# Patient Record
Sex: FEMALE | Race: BLACK OR AFRICAN AMERICAN | NOT HISPANIC OR LATINO | Employment: OTHER | URBAN - NONMETROPOLITAN AREA
[De-identification: names, ages, dates, MRNs, and addresses within clinical notes are randomized per-mention and may not be internally consistent; named-entity substitution may affect disease eponyms.]

---

## 2017-05-14 ENCOUNTER — OFFICE VISIT (OUTPATIENT)
Dept: RETAIL CLINIC | Facility: CLINIC | Age: 57
End: 2017-05-14

## 2017-05-14 VITALS
HEART RATE: 88 BPM | BODY MASS INDEX: 40.63 KG/M2 | SYSTOLIC BLOOD PRESSURE: 134 MMHG | HEIGHT: 64 IN | TEMPERATURE: 98.1 F | RESPIRATION RATE: 20 BRPM | OXYGEN SATURATION: 99 % | DIASTOLIC BLOOD PRESSURE: 88 MMHG | WEIGHT: 238 LBS

## 2017-05-14 DIAGNOSIS — J20.9 ACUTE BRONCHITIS, UNSPECIFIED ORGANISM: Primary | ICD-10-CM

## 2017-05-14 DIAGNOSIS — H65.91 OTITIS MEDIA WITH EFFUSION, RIGHT: ICD-10-CM

## 2017-05-14 PROCEDURE — 99203 OFFICE O/P NEW LOW 30 MIN: CPT | Performed by: NURSE PRACTITIONER

## 2017-05-14 RX ORDER — GLIMEPIRIDE 1 MG/1
1 TABLET ORAL 2 TIMES DAILY
COMMUNITY

## 2017-05-14 RX ORDER — MONTELUKAST SODIUM 10 MG/1
10 TABLET ORAL NIGHTLY
COMMUNITY
End: 2021-04-28

## 2017-05-14 RX ORDER — DEXTROMETHORPHAN HYDROBROMIDE AND PROMETHAZINE HYDROCHLORIDE 15; 6.25 MG/5ML; MG/5ML
5 SYRUP ORAL 4 TIMES DAILY PRN
Qty: 120 ML | Refills: 0 | Status: SHIPPED | OUTPATIENT
Start: 2017-05-14 | End: 2017-05-21

## 2017-05-14 RX ORDER — OMEPRAZOLE 40 MG/1
40 CAPSULE, DELAYED RELEASE ORAL DAILY
COMMUNITY
End: 2021-04-28

## 2017-05-14 RX ORDER — FLUOXETINE HYDROCHLORIDE 40 MG/1
40 CAPSULE ORAL DAILY
COMMUNITY
End: 2021-04-28

## 2017-05-14 RX ORDER — HYDROXYZINE PAMOATE 25 MG/1
25 CAPSULE ORAL NIGHTLY PRN
COMMUNITY
End: 2021-04-28

## 2017-05-14 RX ORDER — BUSPIRONE HYDROCHLORIDE 15 MG/1
7.5 TABLET ORAL 2 TIMES DAILY
COMMUNITY
End: 2021-04-28

## 2017-05-14 RX ORDER — MELOXICAM 7.5 MG/1
7.5 TABLET ORAL 2 TIMES DAILY
COMMUNITY
End: 2021-04-28

## 2017-05-14 RX ORDER — HYDROCHLOROTHIAZIDE 12.5 MG/1
12.5 TABLET ORAL DAILY
COMMUNITY
End: 2022-09-23 | Stop reason: SDUPTHER

## 2017-05-14 RX ORDER — ALBUTEROL SULFATE 90 UG/1
2 AEROSOL, METERED RESPIRATORY (INHALATION) EVERY 4 HOURS PRN
Qty: 1 INHALER | Refills: 0 | Status: SHIPPED | OUTPATIENT
Start: 2017-05-14 | End: 2023-01-12

## 2017-05-14 RX ORDER — FLUCONAZOLE 150 MG/1
TABLET ORAL
Qty: 2 TABLET | Refills: 0 | Status: SHIPPED | OUTPATIENT
Start: 2017-05-14 | End: 2021-04-28

## 2017-05-14 RX ORDER — AZITHROMYCIN 250 MG/1
TABLET, FILM COATED ORAL
Qty: 6 TABLET | Refills: 0 | Status: SHIPPED | OUTPATIENT
Start: 2017-05-14 | End: 2021-04-28

## 2017-05-14 RX ORDER — ATORVASTATIN CALCIUM 20 MG/1
20 TABLET, FILM COATED ORAL DAILY
COMMUNITY
End: 2021-04-28

## 2017-05-14 RX ORDER — CETIRIZINE HYDROCHLORIDE 10 MG/1
10 TABLET ORAL DAILY
COMMUNITY
End: 2022-09-16

## 2021-04-20 RX ORDER — DEXLANSOPRAZOLE 60 MG/1
60 CAPSULE, DELAYED RELEASE ORAL DAILY
COMMUNITY

## 2021-04-20 RX ORDER — METHOCARBAMOL 500 MG/1
500 TABLET, FILM COATED ORAL 4 TIMES DAILY
COMMUNITY

## 2021-04-20 RX ORDER — DULOXETIN HYDROCHLORIDE 60 MG/1
60 CAPSULE, DELAYED RELEASE ORAL DAILY
COMMUNITY
End: 2022-09-23 | Stop reason: SDUPTHER

## 2021-04-20 RX ORDER — TOPIRAMATE 100 MG/1
100 TABLET, FILM COATED ORAL DAILY
COMMUNITY
End: 2021-04-28

## 2021-04-28 ENCOUNTER — OFFICE VISIT (OUTPATIENT)
Dept: BARIATRICS/WEIGHT MGMT | Facility: CLINIC | Age: 61
End: 2021-04-28

## 2021-04-28 VITALS
RESPIRATION RATE: 18 BRPM | DIASTOLIC BLOOD PRESSURE: 68 MMHG | HEIGHT: 64 IN | SYSTOLIC BLOOD PRESSURE: 124 MMHG | OXYGEN SATURATION: 99 % | WEIGHT: 226.5 LBS | TEMPERATURE: 97.7 F | BODY MASS INDEX: 38.67 KG/M2 | HEART RATE: 88 BPM

## 2021-04-28 DIAGNOSIS — F41.9 ANXIETY: ICD-10-CM

## 2021-04-28 DIAGNOSIS — D50.9 IRON DEFICIENCY ANEMIA, UNSPECIFIED IRON DEFICIENCY ANEMIA TYPE: ICD-10-CM

## 2021-04-28 DIAGNOSIS — R06.00 DYSPNEA, UNSPECIFIED TYPE: ICD-10-CM

## 2021-04-28 DIAGNOSIS — R53.83 FATIGUE, UNSPECIFIED TYPE: ICD-10-CM

## 2021-04-28 DIAGNOSIS — K21.9 GASTROESOPHAGEAL REFLUX DISEASE, UNSPECIFIED WHETHER ESOPHAGITIS PRESENT: ICD-10-CM

## 2021-04-28 DIAGNOSIS — Z87.891 FORMER SMOKER: ICD-10-CM

## 2021-04-28 DIAGNOSIS — E66.9 OBESITY (BMI 30-39.9): ICD-10-CM

## 2021-04-28 DIAGNOSIS — E11.69 TYPE 2 DIABETES MELLITUS WITH OTHER SPECIFIED COMPLICATION, WITHOUT LONG-TERM CURRENT USE OF INSULIN (HCC): ICD-10-CM

## 2021-04-28 DIAGNOSIS — G47.30 SLEEP APNEA, UNSPECIFIED TYPE: ICD-10-CM

## 2021-04-28 DIAGNOSIS — E78.00 ELEVATED CHOLESTEROL: ICD-10-CM

## 2021-04-28 DIAGNOSIS — T78.40XS ALLERGIC DISORDER, SEQUELA: Primary | ICD-10-CM

## 2021-04-28 DIAGNOSIS — G43.909 MIGRAINE WITHOUT STATUS MIGRAINOSUS, NOT INTRACTABLE, UNSPECIFIED MIGRAINE TYPE: ICD-10-CM

## 2021-04-28 DIAGNOSIS — I10 HYPERTENSION, UNSPECIFIED TYPE: ICD-10-CM

## 2021-04-28 DIAGNOSIS — F32.A DEPRESSION, UNSPECIFIED DEPRESSION TYPE: ICD-10-CM

## 2021-04-28 PROCEDURE — 99204 OFFICE O/P NEW MOD 45 MIN: CPT | Performed by: PHYSICIAN ASSISTANT

## 2021-04-28 RX ORDER — ATORVASTATIN CALCIUM 40 MG/1
40 TABLET, FILM COATED ORAL DAILY
COMMUNITY
Start: 2021-03-13

## 2021-04-28 RX ORDER — FEXOFENADINE HCL 180 MG/1
180 TABLET ORAL DAILY
COMMUNITY

## 2021-04-28 RX ORDER — TOPIRAMATE 100 MG/1
100 TABLET, FILM COATED ORAL
COMMUNITY
End: 2022-09-23 | Stop reason: SDUPTHER

## 2021-04-28 RX ORDER — DULOXETIN HYDROCHLORIDE 30 MG/1
30 CAPSULE, DELAYED RELEASE ORAL DAILY
COMMUNITY
Start: 2021-02-22 | End: 2022-09-23 | Stop reason: SDUPTHER

## 2021-04-28 NOTE — PROGRESS NOTES
[facility] BARIATRIC SURGERY  [address]  [address]  Formerly [facility] [id number] [phone]      Patient  Name:  Rolanda Lao  :  1960        Chief Complaint:  weight gain; unable to maintain weight loss    History of Present Illness:  Rolanda Lao is a 60 y.o. female who presents today for evaluation, education and consultation regarding bariatric and metabolic surgery. The patient is interested in sleeve gastrectomy with Dr. Montgomery.     Rolanda has been overweight for at least 42 years, has been 35 pounds or more overweight for at least 42 years, has been 100 pounds or more overweight for 36 or more years and started dieting at age 15.      Previous diet attempts include: High Protein, Low Carbohydrate, Salima's Diet, [place], Cabbage Soup, Fasting and Slim Fast; Weight Watchers; Prozac.  The most weight Rolanda lost was 50 pounds on starving but was only able to maintain that weight loss for 22 years.  Her maximum lifetime weight is 289 pounds.    As above, patient has been overweight for many years, with numerous failed dietary/weight loss attempts.  She now has obesity related comorbidities and as such has decided to pursue weight loss surgery.      Saw [facility] for hiatal hernia as was advised weightloss prior to HHR.  She states she has h/o prior hiatal hernia repair with Dr. Grey at [place] with mesh placement in . Symptoms resolved then returned 3y ago after stomach virus and forceful vomiting.   Now with uncontrolled reflux on dexilant 60mg daily with nighttime symptoms on a daily basis. Also with occ dysphagia. Denies nausea, vomiting, abd pain or other GI complaints. GB present. Says she had a GES last fall and was normal.  Most recent EGD 3/2020 with esophagitis. bx negative. No mention of hiatal hernia. No h/o h pylori.     H/o HTN, HLD, sleep apnea- noncompliant with CPAP, DM dx  not on insulin, last A1C 5.1. iron def anemia in the past, not on  "iron currently. Migraines treated with topamax and prn APAP, anxiety/ depression. Environmental allergies on allergy injections.     All other past medical, surgical, social and family history have been obtained and discussed as pertinent to bariatric surgery as below.     Past Medical History:   Diagnosis Date   • Acid reflux     uncontrolled on dexilant 60mg QD, wakes up nightly with symptoms   • Allergic     allergy injections   • Anxiety    • Depression    • Depression    • Elevated cholesterol    • Fatigue    • Hiatal hernia     with past repair   • Hypertension    • Iron deficiency anemia     not on supplments   • Migraines     topamax nightly and APAP prn   • Obesity (BMI 30-39.9)    • Sleep apnea     non compliant with CPAP   • Type 2 diabetes mellitus (CMS/[facility]) 2001    not on insulin, last A1C 5.1     Past Surgical History:   Procedure Laterality Date   • CARPAL TUNNEL RELEASE     • ENDOSCOPY  03/13/2020    Dr. Paul Beauchamp   • KNEE SURGERY      bilateral 1997/1998   • LAPAROSCOPY REPAIR HIATAL HERNIA  2016    Dr. Matilda Beauchamp? with mesh   • ROTATOR CUFF REPAIR     • SINUS SURGERY         Allergies   Allergen Reactions   • Morphine And Related Itching     \"skin crawls\" tolerates other pain meds   • Latex Rash     blister       Current Outpatient Medications:   •  albuterol (PROVENTIL HFA;VENTOLIN HFA) 108 (90 BASE) MCG/ACT inhaler, Inhale 2 puffs Every 4 (Four) Hours As Needed for Wheezing., Disp: 1 inhaler, Rfl: 0  •  atorvastatin (LIPITOR) 40 MG tablet, Take 40 mg by mouth Daily., Disp: , Rfl:   •  Canagliflozin-Metformin HCl (INVOKAMET)  MG tablet, Take  by mouth., Disp: , Rfl:   •  cetirizine (zyrTEC) 10 MG tablet, Take 10 mg by mouth Daily., Disp: , Rfl:   •  dexlansoprazole (Dexilant) 60 MG capsule, Take 60 mg by mouth Daily., Disp: , Rfl:   •  DULoxetine (CYMBALTA) 30 MG capsule, Take 30 mg by mouth Daily., Disp: , Rfl:   •  DULoxetine (CYMBALTA) 60 MG capsule, Take 60 mg by mouth " Daily., Disp: , Rfl:   •  fexofenadine (ALLEGRA) 180 MG tablet, Take 180 mg by mouth Daily., Disp: , Rfl:   •  glimepiride (AMARYL) 1 MG tablet, Take 1 mg by mouth Every Morning Before Breakfast., Disp: , Rfl:   •  hydrochlorothiazide (HYDRODIURIL) 12.5 MG tablet, Take 12.5 mg by mouth Daily., Disp: , Rfl:   •  metFORMIN (GLUCOPHAGE) 1000 MG tablet, Take 1,000 mg by mouth., Disp: , Rfl:   •  methocarbamol (ROBAXIN) 500 MG tablet, Take 500 mg by mouth 4 (Four) Times a Day., Disp: , Rfl:   •  topiramate (TOPAMAX) 100 MG tablet, Take 100 mg by mouth 2 (Two) Times a Day., Disp: , Rfl:     Social History     Socioeconomic History   • Marital status:      Spouse name: Not on file   • Number of children: Not on file   • Years of education: Not on file   • Highest education level: Not on file   Tobacco Use   • Smoking status: Former Smoker     Packs/day: 2.00     Years: 10.00     Pack years: 20.00     Types: Cigarettes     Quit date: 2001     Years since quittin.0   • Smokeless tobacco: Never Used   Vaping Use   • Vaping Use: Never used   Substance and Sexual Activity   • Alcohol use: No   • Drug use: Yes     Types: Marijuana     Comment: occasionally   • Sexual activity: Defer     Family History   Problem Relation Age of Onset   • Obesity Mother    • Diabetes Mother    • Hypertension Mother    • Sleep apnea Mother    • Obesity Father    • Obesity Sister    • Hypertension Sister    • Sleep apnea Sister    • Obesity Brother    • Hypertension Brother    • Stroke Brother    • Obesity Maternal Grandmother    • Obesity Maternal Grandfather    • Obesity Paternal Grandmother    • Heart attack Paternal Grandmother    • Obesity Paternal Grandfather    • Diabetes Paternal Grandfather    • Obesity Sister    • Hypertension Sister    • Obesity Brother    • Hypertension Brother    • Heart attack Brother    • Sleep apnea Brother        Review of Systems:  Constitutional:  Reports fatigue, weight gain and denies fevers,  chills.  HEENT:  denies headache, ear pain or loss of hearing, blurred or double vision, nasal discharge or sore throat.  Cardiovascular:  Reports HTN, HLD and denies CAD, Atrial Fib, hx heart disease, heart murmur, hx MI, chest pain, palpitations, edema, hx DVT.  Respiratory:  Reports sleep apnea and denies dyspnea on exertion, shortness of breath , cough , wheezing, asthma, hx PE.  Gastrointestinal:  Reports dysphagia, heartburn and denies nausea, vomiting, abdominal pain, IBS, diarrhea, constipation, melena, blood in stool, gallbladder issues, liver disease, hx pancreatitis.  Genitourinary:  Reports none and denies history of  frequent UTI, incontinence, hematuria, dysuria, polyuria, polydipsia, renal insufficiency, renal failure.    Musculoskeletal:  Reports none and denies joint pain, fibromyalgia, arthritis and autoimmune disease.  Neurological:  Reports migraines and denies numbness /tingling, dizziness, confusion, seizure, stroke.  Psychiatric:  Reports anxiety, depression and denies bipolar disorder, suicidal ideation, hx suicide attempt, hx self injury, hx substance abuse, eating disorder.  Endocrine:  Reports diabetes and denies thyroid disease, gout.  Hematologic:  Reports anemia and denies bruising, bleeding disorder, hx blood transfusion.  Skin:  Reports none and denies rashes, hx MRSA.      Physical Exam:  Vital Signs:  Weight: 103 kg (226 lb 8 oz)   Body mass index is 38.88 kg/m².  Temp: 97.7 °F (36.5 °C)   Heart Rate: 88   BP: 124/68     Physical Exam  Vitals reviewed.   Constitutional:       Appearance: She is well-developed. She is obese.   HENT:      Head: Normocephalic.      Comments: mask  Neck:      Thyroid: No thyromegaly.   Cardiovascular:      Rate and Rhythm: Normal rate and regular rhythm.      Heart sounds: Normal heart sounds.   Pulmonary:      Effort: Pulmonary effort is normal. No respiratory distress.      Breath sounds: Normal breath sounds. No wheezing.   Abdominal:      General:  Bowel sounds are normal. There is no distension.      Palpations: Abdomen is soft.      Tenderness: There is no abdominal tenderness.      Comments: Lap scars   Musculoskeletal:         General: Normal range of motion.      Cervical back: Normal range of motion and neck supple.   Skin:     General: Skin is warm and dry.   Neurological:      Mental Status: She is alert and oriented to person, place, and time.   Psychiatric:         Mood and Affect: Mood normal.         Behavior: Behavior normal.         Thought Content: Thought content normal.         Judgment: Judgment normal.         Patient Active Problem List   Diagnosis   • Hypertension   • Elevated cholesterol   • Type 2 diabetes mellitus (CMS/HCC)   • Sleep apnea   • Depression   • Allergic   • Acid reflux   • Migraines   • Anxiety   • Iron deficiency anemia   • Fatigue   • Obesity (BMI 30-39.9)       Assessment:    Rolanda Lao is a 60 y.o. year old female with medically complicated obesity pursuing sleeve gastrectomy.    Weight loss surgery is deemed medically necessary given the following obesity related comorbidities including sleep apnea, diabetes, hypertension, dyslipidemia, GERD, edema and depression with current Weight: 103 kg (226 lb 8 oz) and Body mass index is 38.88 kg/m²..    Plan:  The consultation plan and program requirements were reviewed with the patient.  The patient has been advised that a letter of medical support must be obtained from her primary care physician or referring provider. A psychological evaluation will be arranged.  A nutritional evaluation will be performed.  The patient was advised to start a high protein and low carbohydrate diet.  Necessary lifestyle modifications were discussed.  Instructions on how to access BIRD was given to the patient.  BIRD is an internet based educational video that explains the surgical procedure chosen and answers basic questions regarding that procedure.     Patient's Body mass index is 38.88  kg/m². BMI is above normal parameters. Recommendations include: exercise counseling, referral to a nutritionist and bariatric surgery.        Preoperative testing will include: CBC, CMP, Lipids, TSH, HgA1C, EKG, CXR, Pulmonary Function Testing, Gastric Emptying Study and EGD, HP serum ,UDS    Additional preop clearances required prior to surgery: Cardiology.      The patient has been educated on expected postoperative lifestyle changes, including commitment to high protein diet, vitamin regimen, and exercise program.  They are aware that support groups are encouraged for optimal weight loss results. Patient understands that bariatric surgery is not cosmetic surgery but rather a tool to help make a lifelong commitment to lifestyle changes including diet, exercise, behavior modifications, and healthy habits. The procedure was discussed with the patient and all questions were answered. The importance of avoiding ASA/ NSAIDS/ steroids/ tobacco/ hormones/ immunomodulators perioperatively was discussed.         Lauren Christianson PA-C

## 2021-04-29 LAB
ALBUMIN SERPL-MCNC: 4.6 G/DL (ref 3.8–4.9)
ALBUMIN/GLOB SERPL: 2.1 {RATIO} (ref 1.2–2.2)
ALP SERPL-CCNC: 98 IU/L (ref 39–117)
ALT SERPL-CCNC: 16 IU/L (ref 0–32)
AST SERPL-CCNC: 16 IU/L (ref 0–40)
BASOPHILS # BLD AUTO: 0.1 X10E3/UL (ref 0–0.2)
BASOPHILS NFR BLD AUTO: 2 %
BILIRUB SERPL-MCNC: <0.2 MG/DL (ref 0–1.2)
BUN SERPL-MCNC: 17 MG/DL (ref 6–24)
BUN/CREAT SERPL: 17 (ref 9–23)
CALCIUM SERPL-MCNC: 10.2 MG/DL (ref 8.7–10.2)
CHLORIDE SERPL-SCNC: 102 MMOL/L (ref 96–106)
CHOLEST SERPL-MCNC: 216 MG/DL (ref 100–199)
CO2 SERPL-SCNC: 24 MMOL/L (ref 20–29)
CREAT SERPL-MCNC: 1 MG/DL (ref 0.57–1)
EOSINOPHIL # BLD AUTO: 0.2 X10E3/UL (ref 0–0.4)
EOSINOPHIL NFR BLD AUTO: 4 %
ERYTHROCYTE [DISTWIDTH] IN BLOOD BY AUTOMATED COUNT: 16.4 % (ref 11.7–15.4)
GLOBULIN SER CALC-MCNC: 2.2 G/DL (ref 1.5–4.5)
GLUCOSE SERPL-MCNC: 107 MG/DL (ref 65–99)
H PYLORI IGA SER-ACNC: <9 UNITS (ref 0–8.9)
H PYLORI IGG SER IA-ACNC: 0.27 INDEX VALUE (ref 0–0.79)
H PYLORI IGM SER-ACNC: <9 UNITS (ref 0–8.9)
HBA1C MFR BLD: 6.7 % (ref 4.8–5.6)
HCT VFR BLD AUTO: 38.9 % (ref 34–46.6)
HDLC SERPL-MCNC: 51 MG/DL
HGB BLD-MCNC: 12.3 G/DL (ref 11.1–15.9)
IMM GRANULOCYTES # BLD AUTO: 0 X10E3/UL (ref 0–0.1)
IMM GRANULOCYTES NFR BLD AUTO: 1 %
LDLC SERPL CALC-MCNC: 143 MG/DL (ref 0–99)
LYMPHOCYTES # BLD AUTO: 2.5 X10E3/UL (ref 0.7–3.1)
LYMPHOCYTES NFR BLD AUTO: 38 %
MCH RBC QN AUTO: 24.1 PG (ref 26.6–33)
MCHC RBC AUTO-ENTMCNC: 31.6 G/DL (ref 31.5–35.7)
MCV RBC AUTO: 76 FL (ref 79–97)
MONOCYTES # BLD AUTO: 0.5 X10E3/UL (ref 0.1–0.9)
MONOCYTES NFR BLD AUTO: 8 %
NEUTROPHILS # BLD AUTO: 3.2 X10E3/UL (ref 1.4–7)
NEUTROPHILS NFR BLD AUTO: 47 %
PLATELET # BLD AUTO: 404 X10E3/UL (ref 150–450)
POTASSIUM SERPL-SCNC: 4 MMOL/L (ref 3.5–5.2)
PROT SERPL-MCNC: 6.8 G/DL (ref 6–8.5)
RBC # BLD AUTO: 5.11 X10E6/UL (ref 3.77–5.28)
SODIUM SERPL-SCNC: 139 MMOL/L (ref 134–144)
TRIGL SERPL-MCNC: 124 MG/DL (ref 0–149)
TSH SERPL DL<=0.005 MIU/L-ACNC: 1.24 UIU/ML (ref 0.45–4.5)
VLDLC SERPL CALC-MCNC: 22 MG/DL (ref 5–40)
WBC # BLD AUTO: 6.5 X10E3/UL (ref 3.4–10.8)

## 2021-04-30 ENCOUNTER — OFFICE VISIT (OUTPATIENT)
Dept: CARDIOLOGY | Facility: CLINIC | Age: 61
End: 2021-04-30

## 2021-04-30 VITALS
HEART RATE: 109 BPM | DIASTOLIC BLOOD PRESSURE: 86 MMHG | OXYGEN SATURATION: 99 % | RESPIRATION RATE: 18 BRPM | BODY MASS INDEX: 38.41 KG/M2 | WEIGHT: 225 LBS | SYSTOLIC BLOOD PRESSURE: 120 MMHG | HEIGHT: 64 IN

## 2021-04-30 DIAGNOSIS — Z01.810 PREOP CARDIOVASCULAR EXAM: Primary | ICD-10-CM

## 2021-04-30 PROCEDURE — 99203 OFFICE O/P NEW LOW 30 MIN: CPT | Performed by: INTERNAL MEDICINE

## 2021-04-30 PROCEDURE — 93000 ELECTROCARDIOGRAM COMPLETE: CPT | Performed by: INTERNAL MEDICINE

## 2021-04-30 NOTE — PROGRESS NOTES
[facility] OP Consult Note    Rolanda Lao  [id number]  2021    Referred By: Lauren Christianson PA-C    Chief Complaint: Preoperative cardiac risk assessment    History of Present Illness:   Mrs. Rolanda Lao is a 60 y.o. female who presents to the Cardiology Clinic for evaluation of preoperative cardiac risk assessment in consideration of undergoing bariatric surgery for weight loss.  The patient has a past medical history significant for hypertension, hyperlipidemia, type 2 diabetes mellitus, and obesity with a BMI 38.62 kg/m².  She does not have any significant past cardiac history.  She presents today for preoperative risk assessment prior to undergoing bariatric surgery with gastric sleeve placement.  The patient reports she is active walking for exercise on a regular basis, and denies exertional chest pain or anginal symptoms.  No significant exertional dyspnea.  She denies any history of palpitations.  No presyncopal or syncopal events.  She reports mild fluctuating lower extremity edema typically worse after high sodium intake.  No orthopnea or PND.  No specific complaints at this time.    Past Cardiac Testin. Last Coronary Angio: None  2. Prior Stress Testing: None  3. Last Echo: None  4. Prior Holter Monitor: None    Review of Systems:   Review of Systems   Constitutional: Negative for activity change, appetite change, chills, diaphoresis, fatigue, fever, unexpected weight gain and unexpected weight loss.   Eyes: Negative for blurred vision and double vision.   Respiratory: Negative for cough, chest tightness, shortness of breath and wheezing.    Cardiovascular: Negative for chest pain, palpitations and leg swelling.   Gastrointestinal: Negative for abdominal pain, anal bleeding, blood in stool and GERD.   Endocrine: Negative for cold intolerance and heat intolerance.   Genitourinary: Negative for hematuria.   Neurological: Negative for dizziness, syncope,  weakness and light-headedness.   Hematological: Does not bruise/bleed easily.   Psychiatric/Behavioral: Negative for depressed mood and stress. The patient is not nervous/anxious.        Past Medical History:   Past Medical History:   Diagnosis Date   • Acid reflux     uncontrolled on dexilant 60mg QD, wakes up nightly with symptoms   • Allergic     allergy injections   • Anxiety    • Depression    • Depression    • Elevated cholesterol    • Fatigue    • Hiatal hernia     with past repair   • Hypertension    • Iron deficiency anemia     not on supplments   • Migraines     topamax nightly and APAP prn   • Obesity (BMI 30-39.9)    • Sleep apnea     non compliant with CPAP   • Type 2 diabetes mellitus (CMS/[facility]) 2001    not on insulin, last A1C 5.1       Past Surgical History:   Past Surgical History:   Procedure Laterality Date   • CARPAL TUNNEL RELEASE     • ENDOSCOPY  03/13/2020    Dr. Paul Beauchamp   • KNEE SURGERY      bilateral 1997/1998   • LAPAROSCOPY REPAIR HIATAL HERNIA  2016    Dr. Matilda Beauchamp? with mesh   • ROTATOR CUFF REPAIR     • SINUS SURGERY         Family History:   Family History   Problem Relation Age of Onset   • Obesity Mother    • Diabetes Mother    • Hypertension Mother    • Sleep apnea Mother    • Obesity Father    • Obesity Sister    • Hypertension Sister    • Sleep apnea Sister    • Obesity Brother    • Hypertension Brother    • Stroke Brother    • Obesity Maternal Grandmother    • Obesity Maternal Grandfather    • Obesity Paternal Grandmother    • Heart attack Paternal Grandmother    • Obesity Paternal Grandfather    • Diabetes Paternal Grandfather    • Obesity Sister    • Hypertension Sister    • Obesity Brother    • Hypertension Brother    • Heart attack Brother    • Sleep apnea Brother        Social History:   Social History     Socioeconomic History   • Marital status:      Spouse name: Not on file   • Number of children: Not on file   • Years of education: Not on file   •  "Highest education level: Not on file   Tobacco Use   • Smoking status: Former Smoker     Packs/day: 2.00     Years: 10.00     Pack years: 20.00     Types: Cigarettes     Quit date: 2001     Years since quittin.0   • Smokeless tobacco: Never Used   Vaping Use   • Vaping Use: Never used   Substance and Sexual Activity   • Alcohol use: No   • Drug use: Yes     Types: Marijuana     Comment: occasionally   • Sexual activity: Defer       Medications:     Current Outpatient Medications:   •  albuterol (PROVENTIL HFA;VENTOLIN HFA) 108 (90 BASE) MCG/ACT inhaler, Inhale 2 puffs Every 4 (Four) Hours As Needed for Wheezing., Disp: 1 inhaler, Rfl: 0  •  atorvastatin (LIPITOR) 40 MG tablet, Take 40 mg by mouth Daily., Disp: , Rfl:   •  Canagliflozin-Metformin HCl (INVOKAMET)  MG tablet, Take  by mouth 2 (two) times a day., Disp: , Rfl:   •  cetirizine (zyrTEC) 10 MG tablet, Take 10 mg by mouth Daily., Disp: , Rfl:   •  dexlansoprazole (Dexilant) 60 MG capsule, Take 60 mg by mouth Daily., Disp: , Rfl:   •  DULoxetine (CYMBALTA) 30 MG capsule, Take 30 mg by mouth Daily., Disp: , Rfl:   •  DULoxetine (CYMBALTA) 60 MG capsule, Take 60 mg by mouth Daily., Disp: , Rfl:   •  fexofenadine (ALLEGRA) 180 MG tablet, Take 180 mg by mouth Daily., Disp: , Rfl:   •  glimepiride (AMARYL) 1 MG tablet, Take 1 mg by mouth 2 (two) times a day., Disp: , Rfl:   •  hydrochlorothiazide (HYDRODIURIL) 12.5 MG tablet, Take 12.5 mg by mouth Daily., Disp: , Rfl:   •  metFORMIN (GLUCOPHAGE) 1000 MG tablet, Take 1,000 mg by mouth 2 (two) times a day., Disp: , Rfl:   •  methocarbamol (ROBAXIN) 500 MG tablet, Take 500 mg by mouth 4 (Four) Times a Day., Disp: , Rfl:   •  topiramate (TOPAMAX) 100 MG tablet, Take 100 mg by mouth Daily Before Lunch., Disp: , Rfl:     Allergies:   Allergies   Allergen Reactions   • Morphine And Related Itching     \"skin crawls\" tolerates other pain meds   • Latex Rash     blister       Physical Exam:  Vital Signs: " "  Vitals:    04/30/21 0910 04/30/21 0914   BP: 120/84 120/86   BP Location: Left arm Right arm   Patient Position: Sitting    Pulse: 109    Resp: 18    SpO2: 99%    Weight: 102 kg (225 lb)    Height: 162.6 cm (64\")        Physical Exam  Constitutional:       General: She is not in acute distress.     Appearance: She is well-developed. She is obese. She is not diaphoretic.   HENT:      Head: Normocephalic and atraumatic.   Eyes:      General: No scleral icterus.     Pupils: Pupils are equal, round, and reactive to light.   Neck:      Trachea: No tracheal deviation.   Cardiovascular:      Rate and Rhythm: Normal rate and regular rhythm.      Heart sounds: Normal heart sounds. No murmur heard.   No friction rub. No gallop.       Comments: Normal JVD.  Pulmonary:      Effort: Pulmonary effort is normal. No respiratory distress.      Breath sounds: Normal breath sounds. No stridor. No wheezing or rales.   Chest:      Chest wall: No tenderness.   Abdominal:      General: Bowel sounds are normal. There is no distension.      Palpations: Abdomen is soft.      Tenderness: There is no abdominal tenderness. There is no guarding or rebound.   Musculoskeletal:         General: No swelling. Normal range of motion.      Cervical back: Neck supple. No tenderness.   Lymphadenopathy:      Cervical: No cervical adenopathy.   Skin:     General: Skin is warm and dry.      Findings: No erythema.   Neurological:      General: No focal deficit present.      Mental Status: She is alert and oriented to person, place, and time.   Psychiatric:         Mood and Affect: Mood normal.         Behavior: Behavior normal.         Results Review:   I reviewed the patient's new clinical results.  I personally viewed and interpreted the patient's EKG/Telemetry data      ECG 12 Lead    Date/Time: 4/30/2021 9:28 AM  Performed by: Buddy Soler MD  Authorized by: Buddy Soler MD   Comparison: not compared with previous ECG   Rhythm: sinus " rhythm  Rate: normal  QRS axis: normal  Other findings: low voltage  Clinical impression comment: Borderline ECG              Assessment / Plan:     1. Preop cardiovascular exam   --Presents today for preoperative cardiac risk assessment and consideration of undergoing bariatric surgery  --No significant prior cardiac history  --ECG today shows NSR with no significant abnormalities  --No chest pain or exertional anginal symptoms  --No evidence of decompensated CHF, valvulopathy, or arrhythmia  --The patient is currently at low risk for adverse perioperative cardiac events with a low risk revised cardiac risk index of 0.4%.  Given her functional status is > 4 METS without anginal symptoms it is reasonable to proceed with other procedure without further cardiac testing or interventions  --Will follow on a as needed basis    Follow Up:   Return if symptoms worsen or fail to improve.      Thank you for allowing me to participate in the care of your patient. Please to not hesitate to contact me with additional questions or concerns.     MAMIE Soler MD  Interventional Cardiology   04/30/2021  09:26 EDT

## 2021-05-05 ENCOUNTER — DOCUMENTATION (OUTPATIENT)
Dept: BARIATRICS/WEIGHT MGMT | Facility: CLINIC | Age: 61
End: 2021-05-05

## 2021-05-05 NOTE — PROGRESS NOTES
"Weight Loss Surgery  Presurgical Nutrition Assessment     Rolanda Lao  04/28/2021  [id number]  [id number]  1960  female    Surgery desired: Sleeve Gastrectomy    Ht 162.6 cm (64\"); Wt 102 kg (225 #); BMI 38.62  Past Medical History:   Diagnosis Date   • Acid reflux     uncontrolled on dexilant 60mg QD, wakes up nightly with symptoms   • Allergic     allergy injections   • Anxiety    • Depression    • Depression    • Elevated cholesterol    • Fatigue    • Hiatal hernia     with past repair   • Hypertension    • Iron deficiency anemia     not on supplments   • Migraines     topamax nightly and APAP prn   • Obesity (BMI 30-39.9)    • Sleep apnea     non compliant with CPAP   • Type 2 diabetes mellitus (CMS/HCC) 2001    not on insulin, last A1C 5.1     Past Surgical History:   Procedure Laterality Date   • CARPAL TUNNEL RELEASE     • ENDOSCOPY  03/13/2020    Dr. Paul Beauchamp   • KNEE SURGERY      bilateral 1997/1998   • LAPAROSCOPY REPAIR HIATAL HERNIA  2016    Dr. Matilda Beauchamp? with mesh   • ROTATOR CUFF REPAIR     • SINUS SURGERY       Allergies   Allergen Reactions   • Morphine And Related Itching     \"skin crawls\" tolerates other pain meds   • Latex Rash     blister       Current Outpatient Medications:   •  albuterol (PROVENTIL HFA;VENTOLIN HFA) 108 (90 BASE) MCG/ACT inhaler, Inhale 2 puffs Every 4 (Four) Hours As Needed for Wheezing., Disp: 1 inhaler, Rfl: 0  •  atorvastatin (LIPITOR) 40 MG tablet, Take 40 mg by mouth Daily., Disp: , Rfl:   •  Canagliflozin-Metformin HCl (INVOKAMET)  MG tablet, Take  by mouth 2 (two) times a day., Disp: , Rfl:   •  cetirizine (zyrTEC) 10 MG tablet, Take 10 mg by mouth Daily., Disp: , Rfl:   •  dexlansoprazole (Dexilant) 60 MG capsule, Take 60 mg by mouth Daily., Disp: , Rfl:   •  DULoxetine (CYMBALTA) 30 MG capsule, Take 30 mg by mouth Daily., Disp: , Rfl:   •  DULoxetine (CYMBALTA) 60 MG capsule, Take 60 mg by mouth Daily., Disp: , Rfl:   •  fexofenadine " (ALLEGRA) 180 MG tablet, Take 180 mg by mouth Daily., Disp: , Rfl:   •  glimepiride (AMARYL) 1 MG tablet, Take 1 mg by mouth 2 (two) times a day., Disp: , Rfl:   •  hydrochlorothiazide (HYDRODIURIL) 12.5 MG tablet, Take 12.5 mg by mouth Daily., Disp: , Rfl:   •  metFORMIN (GLUCOPHAGE) 1000 MG tablet, Take 1,000 mg by mouth 2 (two) times a day., Disp: , Rfl:   •  methocarbamol (ROBAXIN) 500 MG tablet, Take 500 mg by mouth 4 (Four) Times a Day., Disp: , Rfl:   •  topiramate (TOPAMAX) 100 MG tablet, Take 100 mg by mouth Daily Before Lunch., Disp: , Rfl:       Nutrition Assessment    Estimated energy needs:  1585 kcal    Estimated calories for weight loss:  1400 kcal    IBW (Pounds):  145 #        Excess body weight (Pounds):  80 #       Nutrition Recall  24 Hour recall: (B) (L) (D) -  Reviewed and discussed with patient.  Eats 2-3 meals qd.  Pt has been to diabetes education classes, as well as to [facility] for several months, at least.  Capably states principles of diet for wt mgt.  Brkfast = 1 sl bread /c 2 tbsp PB & 1 c black coffee.  Second meal = cheese slices /c bread, chips & 2 sm Bit o Honey candies.  Drank diet [employer]eps[employer] untio 2017 - now no soda.  Pt devoid of vegetables & fruits & low in protein. Pt to focus on ingesting adeq protein for wt mgt in 3 reg balanced meals + 2-3 high prot snks qd.         Exercise  daily - walks 1/2 mile qd in neighborhood      Education    Provided information packet re:  Sleeve Gastrectomy  1. Reviewed guidelines for higher protein, limited carbohydrate diet to promote weight loss.  Encouraged patient to incorporate these principles of healthy eating from now until approximately 2 weeks prior to bariatric surgery date, when an even lower carbohydrate “liver-shrinking” regimen will be followed. (Information sheet re pre-op diet given).  Explained that after recovery from surgery this diet will again be followed to ensure further loss and for weight maintenance.    2. Encouraged patient to  choose an acceptable protein supplement powder or shake for post-surgery liquid diet.  Provided product guidelines and examples.    3. Explained importance of goal setting to help in changing eating behaviors that are not conducive to weight loss.  Targeted several on a worksheet which also included spaces for patient to work on issues specific to them.  4. Provided follow-up options for support, including contact information for dietitians here, if desired.  Web-based support information and apps for smart phones and computers given.  Noted that monthly support group is offered at this clinic, and that support is associated with successful weight loss.    Recommend that team proceed with surgery and follow per protocol.      Nutrition Goals   Dietary Guidelines per information packet as described above  Protein goal:  grams per day   Carbohydrate goal:  100-140 grams per day  Eliminate soda, sweet tea, etc.     Exercise Goals  Continue current exercise routine   Add 15-30 minutes of activity per day as tolerated      Monserrat Marks RD  05/05/2021  14:01 EDT

## 2021-05-07 ENCOUNTER — HOSPITAL ENCOUNTER (OUTPATIENT)
Dept: PULMONOLOGY | Facility: HOSPITAL | Age: 61
Discharge: HOME | End: 2021-05-07
Admitting: PHYSICIAN ASSISTANT

## 2021-05-07 DIAGNOSIS — R06.00 DYSPNEA, UNSPECIFIED TYPE: ICD-10-CM

## 2021-05-07 DIAGNOSIS — Z87.891 FORMER SMOKER: ICD-10-CM

## 2021-05-07 PROCEDURE — 94726 PLETHYSMOGRAPHY LUNG VOLUMES: CPT | Performed by: INTERNAL MEDICINE

## 2021-05-07 PROCEDURE — 94726 PLETHYSMOGRAPHY LUNG VOLUMES: CPT

## 2021-05-07 PROCEDURE — 94640 AIRWAY INHALATION TREATMENT: CPT

## 2021-05-07 PROCEDURE — 94729 DIFFUSING CAPACITY: CPT | Performed by: INTERNAL MEDICINE

## 2021-05-07 PROCEDURE — 94060 EVALUATION OF WHEEZING: CPT | Performed by: INTERNAL MEDICINE

## 2021-05-07 PROCEDURE — 94060 EVALUATION OF WHEEZING: CPT

## 2021-05-07 PROCEDURE — 94729 DIFFUSING CAPACITY: CPT

## 2021-05-07 RX ORDER — ALBUTEROL SULFATE 90 UG/1
4 AEROSOL, METERED RESPIRATORY (INHALATION) ONCE
Status: COMPLETED | OUTPATIENT
Start: 2021-05-07 | End: 2021-05-07

## 2021-05-07 RX ADMIN — ALBUTEROL SULFATE 4 PUFF: 90 AEROSOL, METERED RESPIRATORY (INHALATION) at 08:22

## 2021-08-08 ENCOUNTER — APPOINTMENT (OUTPATIENT)
Dept: PREADMISSION TESTING | Facility: HOSPITAL | Age: 61
End: 2021-08-08

## 2021-08-08 LAB — SARS-COV-2 RNA PNL SPEC NAA+PROBE: NOT DETECTED

## 2021-08-08 PROCEDURE — C9803 HOPD COVID-19 SPEC COLLECT: HCPCS

## 2021-08-08 PROCEDURE — U0004 COV-19 TEST NON-CDC HGH THRU: HCPCS

## 2021-08-11 ENCOUNTER — OUTSIDE FACILITY SERVICE (OUTPATIENT)
Dept: GASTROENTEROLOGY | Facility: CLINIC | Age: 61
End: 2021-08-11

## 2021-08-11 PROCEDURE — 43235 EGD DIAGNOSTIC BRUSH WASH: CPT | Performed by: INTERNAL MEDICINE

## 2021-08-18 DIAGNOSIS — K31.89 RETAINED FOOD IN STOMACH: Primary | ICD-10-CM

## 2021-08-18 DIAGNOSIS — R10.13 DYSPEPSIA: ICD-10-CM

## 2021-11-01 ENCOUNTER — HOSPITAL ENCOUNTER (OUTPATIENT)
Dept: NUCLEAR MEDICINE | Facility: HOSPITAL | Age: 61
Discharge: HOME | End: 2021-11-01

## 2021-11-01 DIAGNOSIS — K31.89 RETAINED FOOD IN STOMACH: ICD-10-CM

## 2021-11-01 DIAGNOSIS — R10.13 DYSPEPSIA: ICD-10-CM

## 2021-11-01 PROCEDURE — A9541 TC99M SULFUR COLLOID: HCPCS | Performed by: INTERNAL MEDICINE

## 2021-11-01 PROCEDURE — 0 TECHNETIUM SULFUR COLLOID: Performed by: INTERNAL MEDICINE

## 2021-11-01 PROCEDURE — 78264 GASTRIC EMPTYING IMG STUDY: CPT

## 2021-11-01 RX ADMIN — TECHNETIUM TC 99M SULFUR COLLOID 1 DOSE: KIT at 08:44

## 2021-11-05 ENCOUNTER — TELEPHONE (OUTPATIENT)
Dept: GASTROENTEROLOGY | Facility: CLINIC | Age: 61
End: 2021-11-05

## 2021-11-05 NOTE — TELEPHONE ENCOUNTER
----- Message from Pelon Mae MD sent at 11/4/2021  6:08 PM EDT -----  Let Ms. Lao know the gastric emptying study was normal

## 2022-09-16 ENCOUNTER — OFFICE VISIT (OUTPATIENT)
Dept: INTERNAL MEDICINE | Facility: CLINIC | Age: 62
End: 2022-09-16

## 2022-09-16 VITALS
OXYGEN SATURATION: 98 % | BODY MASS INDEX: 34.49 KG/M2 | HEIGHT: 64 IN | WEIGHT: 202 LBS | DIASTOLIC BLOOD PRESSURE: 80 MMHG | SYSTOLIC BLOOD PRESSURE: 110 MMHG | HEART RATE: 92 BPM | TEMPERATURE: 97.2 F

## 2022-09-16 DIAGNOSIS — K59.00 CONSTIPATION, UNSPECIFIED CONSTIPATION TYPE: ICD-10-CM

## 2022-09-16 DIAGNOSIS — K44.9 HIATAL HERNIA: ICD-10-CM

## 2022-09-16 DIAGNOSIS — Z98.890 S/P REPAIR OF PARAESOPHAGEAL HERNIA: ICD-10-CM

## 2022-09-16 DIAGNOSIS — Z87.19 S/P REPAIR OF PARAESOPHAGEAL HERNIA: ICD-10-CM

## 2022-09-16 DIAGNOSIS — N81.10 FEMALE BLADDER PROLAPSE: ICD-10-CM

## 2022-09-16 DIAGNOSIS — K21.9 GASTROESOPHAGEAL REFLUX DISEASE, UNSPECIFIED WHETHER ESOPHAGITIS PRESENT: ICD-10-CM

## 2022-09-16 DIAGNOSIS — E11.65 TYPE 2 DIABETES MELLITUS WITH HYPERGLYCEMIA, WITHOUT LONG-TERM CURRENT USE OF INSULIN: ICD-10-CM

## 2022-09-16 DIAGNOSIS — Z76.89 ENCOUNTER TO ESTABLISH CARE: Primary | ICD-10-CM

## 2022-09-16 DIAGNOSIS — E78.00 ELEVATED CHOLESTEROL: ICD-10-CM

## 2022-09-16 DIAGNOSIS — R13.10 DYSPHAGIA, UNSPECIFIED TYPE: ICD-10-CM

## 2022-09-16 DIAGNOSIS — I10 HYPERTENSION, UNSPECIFIED TYPE: ICD-10-CM

## 2022-09-16 PROCEDURE — 99203 OFFICE O/P NEW LOW 30 MIN: CPT | Performed by: NURSE PRACTITIONER

## 2022-09-16 RX ORDER — LEVOCETIRIZINE DIHYDROCHLORIDE 5 MG/1
TABLET, FILM COATED ORAL
COMMUNITY
Start: 2022-08-25 | End: 2022-11-01 | Stop reason: ALTCHOICE

## 2022-09-16 RX ORDER — HYDROXYZINE PAMOATE 25 MG/1
CAPSULE ORAL
COMMUNITY
Start: 2022-08-30 | End: 2022-10-26 | Stop reason: SDUPTHER

## 2022-09-16 NOTE — PROGRESS NOTES
Date: 2022    Name: Rolanda Lao  : 1960    Chief Complaint:   Chief Complaint   Patient presents with   • [facility]       HPI:  Rolanda Lao is a 62 y.o. female presents to establish care. PCP (Shahla Callahan) left her practice.      Dysphagia. Unable to eat bread, chunky meat.  On 3/25/2022 underwent elective laparoscopic repair of recurrent hiatal hernia, revision of Nissen fundoplication at .  Has been followed by  GI since then.  Takes gas-x before every meal.  Dexilant daily.  Has developed constipation, excess flatulence. Taking probiotics daily, takes laxative or dulcolax. Has lost 40 pounds in the past 6 months.  Would like to be referred to Dr Long in [place].      Brother (at 65) and father (in 70's), half brother (in his 50's) had colon cancer.  Colonoscopy at [facility] in the past year.     T2DM: stopped taking her medications when she was unable to swallow, glucose was > 800. Ended up in ED. Currently taking canagliflozin-metformin HCl, glimepiride.  Since that time blood glucose has been normal when checked. Patient denies foot ulcerations, polyuria, polyphagia, visual disturbances and weight loss.     Vaginal yeast infection for a couple of days.  Has been using OTC medication.  Slightly effective.      At home pelvic floor exercises for 'dropped bladder'.  Worked in a factory for years.  10 years ago was told she could use a pessary.  She has an active sex life, is concerned pessary will interfere.      Allergy induced asthma.  Takes allergy shots once a week at home, daily antihistamine.     History:  LMP: No LMP recorded. Patient is postmenopausal.  Menarche: 12 years old  Sexual activity: monogamous, heterosexual relationship  Last pap date: 4 years ago  Abnormal pap? no  : 4  Para: 2    Do you take any herbs or supplements that were not prescribed by a doctor? no        Health Habits:  Dental Exam. up to date  Eye Exam. up to date, wears  glasses  Exercise: 2 times/week.  Current exercise activities include: chair exercise class  Current diet: not well balanced, unhealthy    History:    Past Medical History:   Diagnosis Date   • Acid reflux     uncontrolled on dexilant 60mg QD, wakes up nightly with symptoms   • Allergic     allergy injections   • Anxiety    • Depression    • Depression    • Elevated cholesterol    • Fatigue    • Hiatal hernia     with past repair   • Hypertension    • Iron deficiency anemia     not on supplments   • Migraines     topamax nightly and APAP prn   • Obesity (BMI 30-39.9)    • Sleep apnea     non compliant with CPAP   • Type 2 diabetes mellitus (HCC) 2001    not on insulin, last A1C 5.1       Past Surgical History:   Procedure Laterality Date   • CARPAL TUNNEL RELEASE     • ENDOSCOPY  03/13/2020    Dr. Paul Beauchamp   • KNEE SURGERY      bilateral 1997/1998   • LAPAROSCOPY REPAIR HIATAL HERNIA  2016    Dr. Matilda Beauchamp? with mesh   • ROTATOR CUFF REPAIR     • SINUS SURGERY         Family History   Problem Relation Age of Onset   • Obesity Mother    • Diabetes Mother    • Hypertension Mother    • Sleep apnea Mother    • Obesity Father    • Obesity Sister    • Hypertension Sister    • Sleep apnea Sister    • Obesity Sister    • Hypertension Sister    • Obesity Brother    • Hypertension Brother    • Stroke Brother    • Obesity Brother    • Hypertension Brother    • Heart attack Brother    • Sleep apnea Brother    • Obesity Maternal Grandmother    • Obesity Maternal Grandfather    • Obesity Paternal Grandmother    • Heart attack Paternal Grandmother    • Obesity Paternal Grandfather    • Diabetes Paternal Grandfather    • Hyperlipidemia Other    • Arthritis Other    • Mental illness Other    • Migraines Other        Social History     Socioeconomic History   • Marital status:    Tobacco Use   • Smoking status: Former Smoker     Packs/day: 2.00     Years: 10.00     Pack years: 20.00     Types: Cigarettes     Quit  "date: 2001     Years since quittin.4   • Smokeless tobacco: Never Used   Vaping Use   • Vaping Use: Never used   Substance and Sexual Activity   • Alcohol use: No   • Drug use: Yes     Types: Marijuana     Comment: occasionally   • Sexual activity: Defer       Allergies   Allergen Reactions   • Latex Rash, Hives, Itching, Other (See Comments) and Unknown (See Comments)     blister  blisters  blisters  blisters  blister  blister   • Morphine And Related Itching, Hives and Unknown (See Comments)     \"skin crawls\" tolerates other pain meds  \"skin crawls\" tolerates other pain meds  \"skin crawls\" tolerates other pain meds         Current Outpatient Medications:   •  albuterol (PROVENTIL HFA;VENTOLIN HFA) 108 (90 BASE) MCG/ACT inhaler, Inhale 2 puffs Every 4 (Four) Hours As Needed for Wheezing., Disp: 1 inhaler, Rfl: 0  •  atorvastatin (LIPITOR) 40 MG tablet, Take 40 mg by mouth Daily., Disp: , Rfl:   •  Canagliflozin-Metformin HCl (INVOKAMET)  MG tablet, Take  by mouth 2 (two) times a day., Disp: , Rfl:   •  dexlansoprazole (Dexilant) 60 MG capsule, Take 60 mg by mouth Daily., Disp: , Rfl:   •  DULoxetine (CYMBALTA) 30 MG capsule, Take 30 mg by mouth Daily., Disp: , Rfl:   •  DULoxetine (CYMBALTA) 60 MG capsule, Take 60 mg by mouth Daily., Disp: , Rfl:   •  fexofenadine (ALLEGRA) 180 MG tablet, Take 180 mg by mouth Daily., Disp: , Rfl:   •  glimepiride (AMARYL) 1 MG tablet, Take 1 mg by mouth 2 (two) times a day., Disp: , Rfl:   •  hydrochlorothiazide (HYDRODIURIL) 12.5 MG tablet, Take 12.5 mg by mouth Daily., Disp: , Rfl:   •  methocarbamol (ROBAXIN) 500 MG tablet, Take 500 mg by mouth 4 (Four) Times a Day., Disp: , Rfl:   •  topiramate (TOPAMAX) 100 MG tablet, Take 100 mg by mouth Daily Before Lunch., Disp: , Rfl:   •  hydrOXYzine pamoate (VISTARIL) 25 MG capsule, , Disp: , Rfl:   •  levocetirizine (XYZAL) 5 MG tablet, , Disp: , Rfl:     VS:  Vitals:    22 1113   BP: 110/80   Pulse: 92   Temp: " "97.2 °F (36.2 °C)   SpO2: 98%   Weight: 91.6 kg (202 lb)   Height: 162.6 cm (64.02\")     Body mass index is 34.66 kg/m².    PE:  Physical Exam  Constitutional:       Appearance: She is not ill-appearing.   HENT:      Head: Normocephalic.      Right Ear: External ear normal.      Left Ear: External ear normal.   Eyes:      Conjunctiva/sclera: Conjunctivae normal.      Pupils: Pupils are equal, round, and reactive to light.   Cardiovascular:      Rate and Rhythm: Normal rate and regular rhythm.      Pulses:           Radial pulses are 2+ on the right side and 2+ on the left side.        Dorsalis pedis pulses are 2+ on the right side and 2+ on the left side.      Heart sounds: Normal heart sounds.   Pulmonary:      Effort: Pulmonary effort is normal.      Breath sounds: Normal breath sounds.   Musculoskeletal:      Cervical back: Normal range of motion and neck supple.      Right lower leg: No edema.      Left lower leg: No edema.   Skin:     General: Skin is warm.      Capillary Refill: Capillary refill takes less than 2 seconds.   Neurological:      Mental Status: She is alert and oriented to person, place, and time.      Coordination: Coordination normal.      Gait: Gait normal.   Psychiatric:         Mood and Affect: Mood normal.         Behavior: Behavior normal.         Thought Content: Thought content normal.         Assessment/Plan:         Diagnoses and all orders for this visit:    1. Encounter to establish care (Primary)    2. Gastroesophageal reflux disease, unspecified whether esophagitis present  -     Ambulatory Referral to Gastroenterology        - Avoid triggers such as spicy or greasy food, alcohol, chocolate, caffeine, carbonated beverages, tomatoes and tomato sauces, onions, smoking        - May raise HOB 30 degrees with risers or blocks, if desired        - Do not eat within 2-3 hours of lying down        - Avoid clothing, belts that constrict midsection        - Losing weight may reduce " symptoms    3. Dysphagia, unspecified type  -     Ambulatory Referral to Gastroenterology    4. Hiatal hernia  -     Ambulatory Referral to Gastroenterology    5. S/P repair of paraesophageal hernia  -     Ambulatory Referral to Gastroenterology    6. Constipation, unspecified constipation type  -     Ambulatory Referral to Gastroenterology    7. Female bladder prolapse  -     Ambulatory Referral to Gynecology    8. Type 2 diabetes mellitus with hyperglycemia, without long-term current use of insulin (HCC)  -     Ambulatory Referral to Nutrition Services        - Follow diabetic diet        - Monitor blood sugars as discussed, to better assess trends and glycemic response to certain food, drink, activities.  Advised fasting blood glucose should be < 130, 2 hours      post-prandial should be < 180        - See eye doctor annually or as discussed        - Wear protective foot wear/no bare feet        - Check feet regularly for calluses or ulcers        - Discussed risk of poorly controlled diabetes and long-term complications        - Exercise as tolerated for 30-45 minutes most days of the week. Gradually increase daily exercise if not currently active.        - Take all medications as prescribed        - States she has had A1c drawn within the last 3 days     9. Hypertension, unspecified type        - Follow heart healthy diet.  Keep sodium intake < 1500 mg per day.  Avoid processed & fast foods.          - Exercise as tolerated, with a goal of 30 minutes of moderate exercise most days.         - Take medications as prescribed.    10. Elevated cholesterol        - Heart healthy diet and daily physical activity.          Return in about 4 weeks (around 10/14/2022) for Annual.

## 2022-09-23 ENCOUNTER — TELEPHONE (OUTPATIENT)
Dept: INTERNAL MEDICINE | Facility: CLINIC | Age: 62
End: 2022-09-23

## 2022-09-23 DIAGNOSIS — E11.65 TYPE 2 DIABETES MELLITUS WITH HYPERGLYCEMIA, WITHOUT LONG-TERM CURRENT USE OF INSULIN: Primary | ICD-10-CM

## 2022-09-23 RX ORDER — HYDROCHLOROTHIAZIDE 12.5 MG/1
12.5 TABLET ORAL DAILY
Qty: 90 TABLET | Refills: 1 | Status: SHIPPED | OUTPATIENT
Start: 2022-09-23

## 2022-09-23 RX ORDER — DULOXETIN HYDROCHLORIDE 60 MG/1
60 CAPSULE, DELAYED RELEASE ORAL DAILY
Qty: 90 CAPSULE | Refills: 1 | Status: SHIPPED | OUTPATIENT
Start: 2022-09-23

## 2022-09-23 RX ORDER — DULOXETIN HYDROCHLORIDE 30 MG/1
30 CAPSULE, DELAYED RELEASE ORAL DAILY
Qty: 90 CAPSULE | Refills: 1 | Status: SHIPPED | OUTPATIENT
Start: 2022-09-23 | End: 2022-11-01 | Stop reason: SDUPTHER

## 2022-09-23 RX ORDER — TOPIRAMATE 100 MG/1
100 TABLET, FILM COATED ORAL
Qty: 90 TABLET | Refills: 1 | Status: SHIPPED | OUTPATIENT
Start: 2022-09-23 | End: 2022-11-01 | Stop reason: SDUPTHER

## 2022-09-23 NOTE — TELEPHONE ENCOUNTER
PATIENT MADE CONTACT TO UPDATE PREVIOUS MESSAGE REGARDING REFERRAL REQUEST FOR A PODIATRIST     REFERRAL REQUEST IS FOR:    [facility] IN [place], [place]    TELEPHONE CONTACT:    [phone]

## 2022-09-23 NOTE — TELEPHONE ENCOUNTER
Caller: Rolanda Lao    Relationship: Self    Best call back number: [phone]    What is the medical concern/diagnosis:  FOOT/ANKLE PAIN    What specialty or service is being requested:  PODIATRIST   What is the provider, practice or medical service name: [facility]    What is the office location: [facility]    What is the office phone number:

## 2022-09-23 NOTE — TELEPHONE ENCOUNTER
Caller: Rolanda Lao    Relationship: Self    Best call back number:     [phone]     Requested Prescriptions:     DULoxetine (CYMBALTA) 30 MG capsule    5 -6 DAY SUPPLY LEFT    DULoxetine (CYMBALTA) 60 MG capsule    2 WEEK SUPPLY LEFT    hydrochlorothiazide (HYDRODIURIL) 12.5 MG tablet    2 WEEK SUPPLY LEFT    topiramate (TOPAMAX) 100 MG tablet    2 WEEK SUPPLY LEFT    IT WAS NOTED THESE MEDICATIONS NEED TO BE REORDERED    PATIENT STATED ALL MEDICATIONS LISTED ABOVE NEED A (90) DAY SUPPLY    Pharmacy where request should be sent: [facility] - [place] [id number] - [phone]  - [phone] FX     Does the patient have less than a 3 day supply:  [] Yes  [x] No    Steven Ballesteros Rep   09/23/22 13:21 VALDEMAR CONNER

## 2022-09-23 NOTE — TELEPHONE ENCOUNTER
Rx Refill Note  Requested Prescriptions     Pending Prescriptions Disp Refills   • DULoxetine (CYMBALTA) 30 MG capsule 90 capsule 1     Sig: Take 1 capsule by mouth Daily.   • DULoxetine (CYMBALTA) 60 MG capsule 90 capsule 1     Sig: Take 1 capsule by mouth Daily.   • hydroCHLOROthiazide (HYDRODIURIL) 12.5 MG tablet 90 tablet 1     Sig: Take 1 tablet by mouth Daily.   • topiramate (TOPAMAX) 100 MG tablet 90 tablet 1     Sig: Take 1 tablet by mouth Daily Before Lunch.      Last office visit with prescribing clinician: 9/16/2022      Next office visit with prescribing clinician: Visit date not found            Christine Dunbar LPN  09/23/22, 13:29 EDT

## 2022-10-05 ENCOUNTER — OFFICE VISIT (OUTPATIENT)
Dept: INTERNAL MEDICINE | Facility: CLINIC | Age: 62
End: 2022-10-05

## 2022-10-05 VITALS
BODY MASS INDEX: 34.15 KG/M2 | RESPIRATION RATE: 16 BRPM | DIASTOLIC BLOOD PRESSURE: 69 MMHG | WEIGHT: 200 LBS | TEMPERATURE: 97.4 F | SYSTOLIC BLOOD PRESSURE: 113 MMHG | HEIGHT: 64 IN | HEART RATE: 88 BPM | OXYGEN SATURATION: 99 %

## 2022-10-05 DIAGNOSIS — H65.111 NON-RECURRENT ACUTE ALLERGIC OTITIS MEDIA OF RIGHT EAR: ICD-10-CM

## 2022-10-05 DIAGNOSIS — Z23 NEED FOR INFLUENZA VACCINATION: ICD-10-CM

## 2022-10-05 DIAGNOSIS — N75.0 CYST OF RIGHT BARTHOLIN'S GLAND: Primary | ICD-10-CM

## 2022-10-05 PROCEDURE — 90686 IIV4 VACC NO PRSV 0.5 ML IM: CPT | Performed by: INTERNAL MEDICINE

## 2022-10-05 PROCEDURE — G0008 ADMIN INFLUENZA VIRUS VAC: HCPCS | Performed by: INTERNAL MEDICINE

## 2022-10-05 PROCEDURE — 99214 OFFICE O/P EST MOD 30 MIN: CPT | Performed by: INTERNAL MEDICINE

## 2022-10-05 RX ORDER — AMOXICILLIN 875 MG/1
TABLET, COATED ORAL
COMMUNITY
Start: 2022-09-30 | End: 2022-11-01

## 2022-10-05 NOTE — PROGRESS NOTES
Chief Complaint   Patient presents with   • Mass     On right side of vagina swollen for the last 2 days   • Earache     Right side checked-last week had a lot of fluid build up       Subjective     History of Present Illness   Rolanda Lao is a 62 y.o. female presenting for follow up with concerns of right labial swelling over the last 2 days.  Swelling is non painful, no redness, no drainage.  Has had similar issues in the past during her pregnancy which improved with heat.      Went to [facility] for fluid behind the right ear.  She has been using nasal spray and hopes the fluid has improved.  Denies pain recently.  Was sent abx but never received it as it went to mail order.       The following portions of the patient's history were reviewed and updated as appropriate: allergies, current medications, past family history, past medical history, past social history, past surgical history and problem list.    Review of Systems   Constitutional: Negative for chills, fatigue and fever.   HENT: Positive for congestion and sinus pressure. Negative for ear pain, rhinorrhea and sore throat.    Eyes: Negative for visual disturbance.   Respiratory: Negative for cough, chest tightness, shortness of breath and wheezing.    Cardiovascular: Negative for chest pain, palpitations and leg swelling.   Gastrointestinal: Negative for abdominal pain, blood in stool, constipation, diarrhea, nausea and vomiting.   Endocrine: Negative for polydipsia and polyuria.   Genitourinary: Positive for vaginal pain. Negative for dysuria and hematuria.   Musculoskeletal: Negative for arthralgias and back pain.   Skin: Negative for rash.   Neurological: Negative for dizziness, light-headedness, numbness and headaches.   Psychiatric/Behavioral: Negative for dysphoric mood and sleep disturbance. The patient is not nervous/anxious.        Allergies   Allergen Reactions   • Latex Rash, Hives, Itching, Other (See Comments) and Unknown (See Comments)  "    blister  blisters  blisters  blisters  blister  blister   • Morphine And Related Itching, Hives and Unknown (See Comments)     \"skin crawls\" tolerates other pain meds  \"skin crawls\" tolerates other pain meds  \"skin crawls\" tolerates other pain meds       Past Medical History:   Diagnosis Date   • Acid reflux     uncontrolled on dexilant 60mg QD, wakes up nightly with symptoms   • Allergic     allergy injections   • Anxiety    • Depression    • Depression    • Elevated cholesterol    • Fatigue    • Hiatal hernia     with past repair   • Hypertension    • Iron deficiency anemia     not on supplments   • Migraines     topamax nightly and APAP prn   • Obesity (BMI 30-39.9)    • Sleep apnea     non compliant with CPAP   • Type 2 diabetes mellitus (HCC)     not on insulin, last A1C 5.1       Social History     Socioeconomic History   • Marital status:    Tobacco Use   • Smoking status: Former Smoker     Packs/day: 2.00     Years: 10.00     Pack years: 20.00     Types: Cigarettes     Quit date: 2001     Years since quittin.4   • Smokeless tobacco: Never Used   Vaping Use   • Vaping Use: Never used   Substance and Sexual Activity   • Alcohol use: No   • Drug use: Yes     Types: Marijuana     Comment: occasionally   • Sexual activity: Defer        Past Surgical History:   Procedure Laterality Date   • CARPAL TUNNEL RELEASE     • ENDOSCOPY  2020    Dr. Paul Beauchamp   • KNEE SURGERY      bilateral    • LAPAROSCOPY REPAIR HIATAL HERNIA      Dr. Matilda Beauchamp? with mesh   • ROTATOR CUFF REPAIR     • SINUS SURGERY         Family History   Problem Relation Age of Onset   • Obesity Mother    • Diabetes Mother    • Hypertension Mother    • Sleep apnea Mother    • Obesity Father    • Obesity Sister    • Hypertension Sister    • Sleep apnea Sister    • Obesity Sister    • Hypertension Sister    • Obesity Brother    • Hypertension Brother    • Stroke Brother    • Obesity Brother    • " "Hypertension Brother    • Heart attack Brother    • Sleep apnea Brother    • Obesity Maternal Grandmother    • Obesity Maternal Grandfather    • Obesity Paternal Grandmother    • Heart attack Paternal Grandmother    • Obesity Paternal Grandfather    • Diabetes Paternal Grandfather    • Hyperlipidemia Other    • Arthritis Other    • Mental illness Other    • Migraines Other          Current Outpatient Medications:   •  albuterol (PROVENTIL HFA;VENTOLIN HFA) 108 (90 BASE) MCG/ACT inhaler, Inhale 2 puffs Every 4 (Four) Hours As Needed for Wheezing., Disp: 1 inhaler, Rfl: 0  •  amoxicillin (AMOXIL) 875 MG tablet, , Disp: , Rfl:   •  atorvastatin (LIPITOR) 40 MG tablet, Take 40 mg by mouth Daily., Disp: , Rfl:   •  Canagliflozin-metFORMIN HCl  MG tablet, Take  by mouth 2 (two) times a day., Disp: , Rfl:   •  dexlansoprazole (Dexilant) 60 MG capsule, Take 60 mg by mouth Daily., Disp: , Rfl:   •  DULoxetine (CYMBALTA) 30 MG capsule, Take 1 capsule by mouth Daily., Disp: 90 capsule, Rfl: 1  •  DULoxetine (CYMBALTA) 60 MG capsule, Take 1 capsule by mouth Daily., Disp: 90 capsule, Rfl: 1  •  fexofenadine (ALLEGRA) 180 MG tablet, Take 180 mg by mouth Daily., Disp: , Rfl:   •  glimepiride (AMARYL) 1 MG tablet, Take 1 mg by mouth 2 (two) times a day., Disp: , Rfl:   •  hydroCHLOROthiazide (HYDRODIURIL) 12.5 MG tablet, Take 1 tablet by mouth Daily., Disp: 90 tablet, Rfl: 1  •  hydrOXYzine pamoate (VISTARIL) 25 MG capsule, , Disp: , Rfl:   •  levocetirizine (XYZAL) 5 MG tablet, , Disp: , Rfl:   •  methocarbamol (ROBAXIN) 500 MG tablet, Take 500 mg by mouth 4 (Four) Times a Day., Disp: , Rfl:   •  topiramate (TOPAMAX) 100 MG tablet, Take 1 tablet by mouth Daily Before Lunch., Disp: 90 tablet, Rfl: 1    Objective   /69   Pulse 88   Temp 97.4 °F (36.3 °C)   Resp 16   Ht 162.6 cm (64\")   Wt 90.7 kg (200 lb)   SpO2 99%   BMI 34.33 kg/m²     Physical Exam  Vitals and nursing note reviewed.   Constitutional:       " Appearance: Normal appearance. She is well-developed.   HENT:      Head: Normocephalic and atraumatic.      Right Ear: Tympanic membrane, ear canal and external ear normal. There is no impacted cerumen.      Left Ear: Tympanic membrane, ear canal and external ear normal. There is no impacted cerumen.   Eyes:      Extraocular Movements: Extraocular movements intact.      Conjunctiva/sclera: Conjunctivae normal.   Pulmonary:      Effort: Pulmonary effort is normal.   Genitourinary:     General: Normal vulva.      Vagina: No vaginal discharge.      Comments: Right labial swelling, soft, about 2cm, no redness  Musculoskeletal:      Cervical back: Normal range of motion and neck supple.   Skin:     General: Skin is warm and dry.      Findings: No rash.   Neurological:      General: No focal deficit present.      Mental Status: She is alert and oriented to person, place, and time.   Psychiatric:         Mood and Affect: Mood normal.         Behavior: Behavior normal.         Assessment & Plan   Diagnoses and all orders for this visit:    1. Cyst of right Bartholin's gland (Primary)    2. Need for influenza vaccination  -     FluLaval/Fluzone >6 mos ([id number])    3. Non-recurrent acute allergic otitis media of right ear          Discussion Summary:  Patient is a 62 y.o. female presenting for follow up.    Right Bartholin cyst  - Small and without signs of active infection.  Patient reassured that sitz bath's and warm heat were reasonable methods to treat for now.  Should she develops persistent symptoms over the next week, we may consider antibiotics.  - This can also be addressed with gynecology at her appointment next month.    Needs flu shot  - Given today.    Right ear otitis media  - Resolved with Flonase nasal spray.  Patient was given antibiotics but never filled the medication.  She does not need to fill antibiotics.    Follow up:  Return in about 1 month (around 11/5/2022) for Medicare Wellness.

## 2022-10-07 ENCOUNTER — TELEPHONE (OUTPATIENT)
Dept: INTERNAL MEDICINE | Facility: CLINIC | Age: 62
End: 2022-10-07

## 2022-10-07 DIAGNOSIS — N75.0 BARTHOLIN CYST: Primary | ICD-10-CM

## 2022-10-07 RX ORDER — SULFAMETHOXAZOLE AND TRIMETHOPRIM 800; 160 MG/1; MG/1
1 TABLET ORAL 2 TIMES DAILY
Qty: 20 TABLET | Refills: 0 | Status: SHIPPED | OUTPATIENT
Start: 2022-10-07 | End: 2022-11-01

## 2022-10-07 NOTE — TELEPHONE ENCOUNTER
Caller: Rolanad Lao    Relationship: Self    Best call back number: [phone]    What medication are you requesting: ANTIBIOTICS    What are your current symptoms: CYST ON VAGINA    How long have you been experiencing symptoms: 10/02/22    Have you had these symptoms before:    [] Yes  [x] No    Have you been treated for these symptoms before:   [] Yes  [x] No    If a prescription is needed, what is your preferred pharmacy and phone number: [facility] [address] [place] [address] [phone] [facility] [phone]      Additional notes:  SAW DR PAPPAS ON 10/05/22, PATIENT STATED THAT DR PAPPAS TOLD HER IF THE CYST HAD NOT DECREASED IN SIZE BY 10/07/22 THAT HE WOULD CALL HER IN ANTIBIOTICS.

## 2022-10-26 RX ORDER — HYDROXYZINE PAMOATE 25 MG/1
25 CAPSULE ORAL NIGHTLY
Qty: 15 CAPSULE | Refills: 0 | Status: SHIPPED | OUTPATIENT
Start: 2022-10-26 | End: 2022-11-01

## 2022-10-26 RX ORDER — HYDROXYZINE PAMOATE 25 MG/1
25 CAPSULE ORAL 2 TIMES DAILY
Qty: 30 CAPSULE | Refills: 0 | Status: CANCELLED | OUTPATIENT
Start: 2022-10-26

## 2022-10-26 NOTE — TELEPHONE ENCOUNTER
Last rx was for 25 mg on 6/1/2022 for 90 pills, left message that we would do one at bedtime and she can discuss higher dose with Mirtha on 11/1/2022

## 2022-10-26 NOTE — TELEPHONE ENCOUNTER
Caller: Rolanda Lao    Relationship: Self    Best call back number: [phone]    Requested Prescriptions:   hydrOXYzine pamoate (VISTARIL) 25 MG capsule    Additional details provided by patient: THE PATIENT STATES THAT THIS PRESCRIPTION NEEDS TO BE 2X A DAY AND THAT SHE HAS BEEN OUT FOR 3 DAYS. THE PATIENT IS REQUESTING A SHORT FILL OF AT LEAST 14 DAYS BE SENT TO THE    [facility] [address] [facility] [address] [phone] [facility] [phone] FX,    AND THE 90 DAY SUPPLY PRESCRIPTION BE CHANGED TO 2X A DAY AND SENT TO [facility] [address] [facility] [phone] [facility] [phone] FX    Does the patient have less than a 3 day supply:  [x] Yes  [] No COMPLETELY OUT AND PATIENT HAS NOT SLEPT IN 3 DAYS    Steven Fleming Rep   10/26/22 12:13 EDT     THANK YOU.

## 2022-11-01 ENCOUNTER — OFFICE VISIT (OUTPATIENT)
Dept: INTERNAL MEDICINE | Facility: CLINIC | Age: 62
End: 2022-11-01

## 2022-11-01 VITALS
HEIGHT: 64 IN | TEMPERATURE: 97.1 F | OXYGEN SATURATION: 99 % | SYSTOLIC BLOOD PRESSURE: 110 MMHG | DIASTOLIC BLOOD PRESSURE: 62 MMHG | HEART RATE: 98 BPM | WEIGHT: 205.5 LBS | BODY MASS INDEX: 35.08 KG/M2

## 2022-11-01 DIAGNOSIS — Z76.0 ENCOUNTER FOR MEDICATION REFILL: Primary | ICD-10-CM

## 2022-11-01 DIAGNOSIS — F41.9 ANXIETY: ICD-10-CM

## 2022-11-01 DIAGNOSIS — G43.909 MIGRAINE WITHOUT STATUS MIGRAINOSUS, NOT INTRACTABLE, UNSPECIFIED MIGRAINE TYPE: ICD-10-CM

## 2022-11-01 DIAGNOSIS — E11.9 TYPE 2 DIABETES MELLITUS WITHOUT COMPLICATION, WITHOUT LONG-TERM CURRENT USE OF INSULIN: ICD-10-CM

## 2022-11-01 DIAGNOSIS — F32.A DEPRESSION, UNSPECIFIED DEPRESSION TYPE: ICD-10-CM

## 2022-11-01 DIAGNOSIS — Z12.31 SCREENING MAMMOGRAM FOR BREAST CANCER: ICD-10-CM

## 2022-11-01 DIAGNOSIS — S39.012A STRAIN OF LUMBAR REGION, INITIAL ENCOUNTER: ICD-10-CM

## 2022-11-01 LAB
EXPIRATION DATE: NORMAL
HBA1C MFR BLD: 5.8 %
Lab: NORMAL

## 2022-11-01 PROCEDURE — 83036 HEMOGLOBIN GLYCOSYLATED A1C: CPT | Performed by: NURSE PRACTITIONER

## 2022-11-01 PROCEDURE — 96372 THER/PROPH/DIAG INJ SC/IM: CPT | Performed by: NURSE PRACTITIONER

## 2022-11-01 PROCEDURE — 3044F HG A1C LEVEL LT 7.0%: CPT | Performed by: NURSE PRACTITIONER

## 2022-11-01 PROCEDURE — 99214 OFFICE O/P EST MOD 30 MIN: CPT | Performed by: NURSE PRACTITIONER

## 2022-11-01 RX ORDER — TOPIRAMATE 100 MG/1
100 TABLET, FILM COATED ORAL
Qty: 90 TABLET | Refills: 0 | Status: SHIPPED | OUTPATIENT
Start: 2022-11-01 | End: 2022-11-01 | Stop reason: SDUPTHER

## 2022-11-01 RX ORDER — MONTELUKAST SODIUM 10 MG/1
TABLET ORAL
COMMUNITY
Start: 2022-10-25 | End: 2022-11-01 | Stop reason: SDUPTHER

## 2022-11-01 RX ORDER — HYDROXYZINE PAMOATE 50 MG/1
50 CAPSULE ORAL NIGHTLY
Qty: 90 CAPSULE | Refills: 0 | Status: SHIPPED | OUTPATIENT
Start: 2022-11-01 | End: 2022-11-01 | Stop reason: SDUPTHER

## 2022-11-01 RX ORDER — TOPIRAMATE 100 MG/1
100 TABLET, FILM COATED ORAL
Qty: 90 TABLET | Refills: 0 | Status: SHIPPED | OUTPATIENT
Start: 2022-11-01

## 2022-11-01 RX ORDER — CLOTRIMAZOLE 1 %
CREAM (GRAM) TOPICAL
COMMUNITY
Start: 2022-10-26

## 2022-11-01 RX ORDER — HYDROXYZINE PAMOATE 50 MG/1
50 CAPSULE ORAL NIGHTLY
Qty: 90 CAPSULE | Refills: 0 | Status: SHIPPED | OUTPATIENT
Start: 2022-11-01 | End: 2023-03-31

## 2022-11-01 RX ORDER — DULOXETIN HYDROCHLORIDE 30 MG/1
30 CAPSULE, DELAYED RELEASE ORAL DAILY
Qty: 90 CAPSULE | Refills: 0 | Status: SHIPPED | OUTPATIENT
Start: 2022-11-01 | End: 2023-03-13

## 2022-11-01 RX ORDER — CALCIUM CITRATE/VITAMIN D3 200MG-6.25
TABLET ORAL
COMMUNITY
Start: 2022-10-17 | End: 2022-11-23 | Stop reason: SDUPTHER

## 2022-11-01 RX ORDER — KETOROLAC TROMETHAMINE 30 MG/ML
60 INJECTION, SOLUTION INTRAMUSCULAR; INTRAVENOUS ONCE
Status: COMPLETED | OUTPATIENT
Start: 2022-11-01 | End: 2022-11-01

## 2022-11-01 RX ORDER — METHYLPREDNISOLONE ACETATE 40 MG/ML
40 INJECTION, SUSPENSION INTRA-ARTICULAR; INTRALESIONAL; INTRAMUSCULAR; SOFT TISSUE ONCE
Status: COMPLETED | OUTPATIENT
Start: 2022-11-01 | End: 2022-11-01

## 2022-11-01 RX ORDER — MONTELUKAST SODIUM 10 MG/1
10 TABLET ORAL NIGHTLY
Qty: 90 TABLET | Refills: 0 | Status: SHIPPED | OUTPATIENT
Start: 2022-11-01

## 2022-11-01 RX ADMIN — METHYLPREDNISOLONE ACETATE 40 MG: 40 INJECTION, SUSPENSION INTRA-ARTICULAR; INTRALESIONAL; INTRAMUSCULAR; SOFT TISSUE at 11:42

## 2022-11-01 RX ADMIN — KETOROLAC TROMETHAMINE 60 MG: 30 INJECTION, SOLUTION INTRAMUSCULAR; INTRAVENOUS at 11:41

## 2022-11-01 NOTE — TELEPHONE ENCOUNTER
Rx Refill Note  Requested Prescriptions     Pending Prescriptions Disp Refills   • hydrOXYzine pamoate (VISTARIL) 50 MG capsule 90 capsule 0     Sig: Take 1 capsule by mouth Every Night.   • topiramate (TOPAMAX) 100 MG tablet 90 tablet 0     Sig: Take 1 tablet by mouth Daily Before Lunch.   • montelukast (SINGULAIR) 10 MG tablet 90 tablet 0     Sig: Take 1 tablet by mouth Every Night.      Last office visit with prescribing clinician: 11/1/2022      Next office visit with prescribing clinician: Visit date not found            Nichelle Cleveland LPN  11/01/22, 11:38 EDT    PATIENT STATES THAT SHE WOULD LIKE THESE MEDS SENT TO [facility].

## 2022-11-01 NOTE — PROGRESS NOTES
"Chief Complaint   Patient presents with   • Back Pain     Lower back pain X 3 weeks; has had rash under neck, discuss increase in hydroxyzine dosage, requesting flonase      Subjective   Rolanda Dorothy Lao is a 62 y.o. female.   mwf here  [facility]   History of Present Illness     Patient presents with left lumbar back strain.  Onset 3 weeks ago raking leaves.  Using muscle relaxer, otc pain relief creams and going to chiropractor without relief.  Needs refill Topamax, cymbalta and wants to discuss increasing hydroxyzine.   Due labs, physical. Will complete A1C today to ensure normal, and have pt return for medicare wellness.   Has Colonoscopy and pap scheduled, needs mammogram ordered.     The following portions of the patient's history were reviewed and updated as appropriate: allergies, current medications, past family history, past medical history, past social history, past surgical history and problem list.    Review of Systems   Constitutional: Negative.    Respiratory: Negative.    Cardiovascular: Negative.    Endocrine: Negative.    Musculoskeletal: Positive for back pain and myalgias.   Psychiatric/Behavioral: Positive for dysphoric mood and sleep disturbance. The patient is nervous/anxious.    All other systems reviewed and are negative.      Objective   /62   Pulse 98   Temp 97.1 °F (36.2 °C) (Temporal)   Ht 162.6 cm (64\")   Wt 93.2 kg (205 lb 8 oz)   SpO2 99%   BMI 35.27 kg/m²   Body mass index is 35.27 kg/m².  Physical Exam  Vitals and nursing note reviewed.   Constitutional:       General: She is not in acute distress.     Appearance: Normal appearance. She is not diaphoretic.   HENT:      Head: Normocephalic and atraumatic.   Eyes:      Extraocular Movements: Extraocular movements intact.   Cardiovascular:      Rate and Rhythm: Normal rate and regular rhythm.   Pulmonary:      Effort: Pulmonary effort is normal.      Breath sounds: Normal breath sounds.   Musculoskeletal:    "   Cervical back: Normal range of motion.      Lumbar back: Spasms and tenderness present. No bony tenderness. Decreased range of motion. Negative right straight leg raise test and negative left straight leg raise test.   Skin:     General: Skin is warm and dry.   Neurological:      Mental Status: She is alert and oriented to person, place, and time.   Psychiatric:         Mood and Affect: Mood normal.         Behavior: Behavior normal.         Labs:  Results for orders placed or performed in visit on 11/01/22   POC Glycosylated Hemoglobin (Hb A1C)    Specimen: Blood   Result Value Ref Range    Hemoglobin A1C 5.8 %    Lot Number [id number]     Expiration Date 5,022,970        Assessment & Plan   Rolanda Lao is here today and the following problems have been addressed:      Diagnoses and all orders for this visit:    1. Encounter for medication refill (Primary)    2. Type 2 diabetes mellitus without complication, without long-term current use of insulin (HCC)  -     POC Glycosylated Hemoglobin (Hb A1C)  - A1c 5.8%, controlled. Continue medications, diabetic diet, and exercise regimen.     3. Strain of lumbar region, initial encounter  -     methylPREDNISolone acetate (DEPO-medrol) injection 40 mg  -     ketorolac (TORADOL) injection 60 mg  - Toradol/depomedrol injection administered in office. Continue proper body mechanics, stretches, otc pain relief creams, muscle relaxers prn. RTC if symptoms persist/worsen     4. Depression, unspecified depression type  -     DULoxetine (CYMBALTA) 30 MG capsule; Take 1 capsule by mouth Daily.  Dispense: 90 capsule; Refill: 0  - Controlled, continue medication as prescribed.     5. Anxiety  -     hydrOXYzine pamoate (VISTARIL) 50 MG capsule; Take 1 capsule by mouth Every Night.  Dispense: 90 capsule; Refill: 0  - Patient states slightly uncontrolled and asking for increase. Will increase hydroxyzine to 50 mg nightly prn.     6. Migraine without status migrainosus, not  intractable, unspecified migraine type  -    topiramate (TOPAMAX) 100 MG tablet; Take 1 tablet by mouth Daily Before Lunch.  Dispense: 90 tablet; Refill: 0  - Controlled. Continue medications as prescribed.    7. Screening mammogram for breast cancer  - Mammo Screening Digital Tomosynthesis Bilateral With CAD    Medications refilled 90 days. Mammogram ordered. Scheduled for colonoscopy and pap smear. Return for Medicare Wellness for labs and further refills.     Follow Up   Return for Medicare Wellness with Arlet at her next available .  Patient was given instructions and counseling regarding her condition or for health maintenance advice. Please see specific information pulled into the AVS if appropriate.     Mirtha DURHAM  [facility] [place]

## 2022-11-02 ENCOUNTER — OFFICE VISIT (OUTPATIENT)
Dept: OBSTETRICS AND GYNECOLOGY | Facility: CLINIC | Age: 62
End: 2022-11-02

## 2022-11-02 VITALS
BODY MASS INDEX: 35.34 KG/M2 | DIASTOLIC BLOOD PRESSURE: 62 MMHG | HEIGHT: 64 IN | WEIGHT: 207 LBS | SYSTOLIC BLOOD PRESSURE: 110 MMHG

## 2022-11-02 DIAGNOSIS — N81.2 UTEROVAGINAL PROLAPSE, INCOMPLETE: ICD-10-CM

## 2022-11-02 DIAGNOSIS — Z01.419 WELL WOMAN EXAM WITH ROUTINE GYNECOLOGICAL EXAM: Primary | ICD-10-CM

## 2022-11-02 DIAGNOSIS — Z12.4 SCREENING FOR MALIGNANT NEOPLASM OF CERVIX: ICD-10-CM

## 2022-11-02 PROCEDURE — 99386 PREV VISIT NEW AGE 40-64: CPT | Performed by: OBSTETRICS & GYNECOLOGY

## 2022-11-04 ENCOUNTER — PATIENT ROUNDING (BHMG ONLY) (OUTPATIENT)
Dept: OBSTETRICS AND GYNECOLOGY | Facility: CLINIC | Age: 62
End: 2022-11-04

## 2022-11-04 LAB — REF LAB TEST METHOD: NORMAL

## 2022-11-04 NOTE — PROGRESS NOTES
November 4, 2022    Hello, may I speak with Rolanda Lao?    My name is Tricia Guevara    I am  with [facility]  [address], [address]  [facility] [address] [phone].    Before we get started may I verify your date of birth? 1960    I am calling to officially welcome you to our practice and ask about your recent visit. Is this a good time to talk? Yes    Tell me about your visit with us. What things went well?  I had a great first experience.  I was treated very kind by Dr. Garvey.     We're always looking for ways to make our patients' experiences even better. Do you have recommendations on ways we may improve?  No    Overall were you satisfied with your first visit to our practice? Yes       I appreciate you taking the time to speak with me today. Is there anything else I can do for you? No    Thank you, and have a great day.

## 2022-11-09 PROBLEM — N81.2 UTEROVAGINAL PROLAPSE, INCOMPLETE: Status: ACTIVE | Noted: 2022-11-09

## 2022-11-09 NOTE — PROGRESS NOTES
Annual Well Woman Visit    Subjective   Chief Complaint   Patient presents with   • Gynecologic Exam     Last pap around 6 years ago, no complaints.     Rolanda Lao is a 62 y.o. year old  presenting to be seen for an annual well woman visit.    She reports a bulge at the opening of the vagina.  She does see something outside of the vagina at times.  She does have pressure symptoms.  No significant urinary symptoms.  Menopause in her early 50s.    She denies sexual dysfunction. She denies urinary complaints including incontinence.    OB Hx:  x2  Contraception: Not applicable  Pap smear: Maybe 5 years ago?  No history of dysplasia  Mammogram: Maybe 5 years ago?  Scheduled already  Colonoscopy: Scheduled for December of this year  DEXA Scan: Never    Past Medical History:   Diagnosis Date   • Acid reflux     uncontrolled on dexilant 60mg QD, wakes up nightly with symptoms   • Allergic     allergy injections   • Anxiety    • Depression    • Depression    • Elevated cholesterol    • Fatigue    • Fibroid    • Hiatal hernia     with past repair   • Hypertension    • Iron deficiency anemia     not on supplments   • Migraines     topamax nightly and APAP prn   • Obesity (BMI 30-39.9)    • Ovarian cyst    • Sleep apnea     non compliant with CPAP   • Type 2 diabetes mellitus (HCC)     not on insulin, last A1C 5.1     Past Surgical History:   Procedure Laterality Date   • CARPAL TUNNEL RELEASE     • ENDOSCOPY  2020    Dr. Paul Beauchamp   • KNEE SURGERY      bilateral    • LAPAROSCOPY REPAIR HIATAL HERNIA      Dr. Matilda Beauchamp? with mesh   • ROTATOR CUFF REPAIR     • SINUS SURGERY       Family History   Problem Relation Age of Onset   • Prostate cancer Father    • Colon cancer Father    • Obesity Father    • Breast cancer Mother    • Obesity Mother    • Diabetes Mother    • Hypertension Mother    • Sleep apnea Mother    • Obesity Brother    • Hypertension Brother    • Stroke  Brother    • Obesity Brother    • Hypertension Brother    • Heart attack Brother    • Sleep apnea Brother    • Obesity Sister    • Hypertension Sister    • Sleep apnea Sister    • Obesity Sister    • Hypertension Sister    • Obesity Paternal Grandfather    • Diabetes Paternal Grandfather    • Obesity Paternal Grandmother    • Heart attack Paternal Grandmother    • Obesity Maternal Grandmother    • Obesity Maternal Grandfather    • Hyperlipidemia Other    • Arthritis Other    • Mental illness Other    • Migraines Other      Social History     Tobacco Use   • Smoking status: Former     Packs/day: 2.00     Years: 10.00     Pack years: 20.00     Types: Cigarettes     Quit date: 2001     Years since quittin.5   • Smokeless tobacco: Never   Vaping Use   • Vaping Use: Never used   Substance Use Topics   • Alcohol use: No   • Drug use: Yes     Types: Marijuana     Comment: occasionally     (Not in a hospital admission)    Latex and Morphine and related  Current Outpatient Medications on File Prior to Visit   Medication Sig Dispense Refill   • albuterol (PROVENTIL HFA;VENTOLIN HFA) 108 (90 BASE) MCG/ACT inhaler Inhale 2 puffs Every 4 (Four) Hours As Needed for Wheezing. 1 inhaler 0   • atorvastatin (LIPITOR) 40 MG tablet Take 40 mg by mouth Daily.     • Canagliflozin-metFORMIN HCl  MG tablet Take  by mouth 2 (two) times a day.     • clotrimazole (LOTRIMIN) 1 % cream Prn     • dexlansoprazole (Dexilant) 60 MG capsule Take 60 mg by mouth Daily.     • DULoxetine (CYMBALTA) 30 MG capsule Take 1 capsule by mouth Daily. 90 capsule 0   • DULoxetine (CYMBALTA) 60 MG capsule Take 1 capsule by mouth Daily. 90 capsule 1   • fexofenadine (ALLEGRA) 180 MG tablet Take 180 mg by mouth Daily.     • glimepiride (AMARYL) 1 MG tablet Take 1 mg by mouth 2 (two) times a day.     • hydroCHLOROthiazide (HYDRODIURIL) 12.5 MG tablet Take 1 tablet by mouth Daily. 90 tablet 1   • hydrOXYzine pamoate (VISTARIL) 50 MG capsule Take 1  "capsule by mouth Every Night. 90 capsule 0   • methocarbamol (ROBAXIN) 500 MG tablet Take 500 mg by mouth 4 (Four) Times a Day.     • montelukast (SINGULAIR) 10 MG tablet Take 1 tablet by mouth Every Night. 90 tablet 0   • topiramate (TOPAMAX) 100 MG tablet Take 1 tablet by mouth Daily Before Lunch. 90 tablet 0   • True Metrix Blood Glucose Test test strip        No current facility-administered medications on file prior to visit.     Social History    Tobacco Use      Smoking status: Former        Packs/day: 2.00        Years: 10.00        Pack years: 20        Types: Cigarettes        Quit date: 2001        Years since quittin.5      Smokeless tobacco: Never      Review of Systems  Pertinent items are noted in HPI, all other systems were reviewed and negative       Objective   /62   Ht 162.6 cm (64\")   Wt 93.9 kg (207 lb)   BMI 35.53 kg/m²     Physical Exam:  General Appearance: alert, pleasant, appears stated age, interactive and cooperative  Breasts: Examined in supine position  Symmetric without masses or skin dimpling  Nipples normal without inversion, lesions or discharge  There are no palpable axillary nodes  Abdomen: no masses, no hepatomegaly, no splenomegaly, soft non-tender, no guarding and no rebound tenderness    Pelvis:  Pelvic: Clinical staff was present for exam  External genitalia:  normal appearance of the external genitalia including Bartholin's and [place]'s glands.  :  urethral meatus normal; urethra hypermobile;  Vagina:  atrophic mucosal changes are present;  Cervix:  normal appearance.  Uterus:  normal size, shape and consistency.  Adnexa:  normal bimanual exam of the adnexa.  Rectal:  digital rectal exam not performed; anus visually normal appearing.  Cystocele GRADE 3  Rectocele GRADE 2  Uterine GRADE 3       Assessment   Annual well woman exam with age appropriate screening  Stage III uterovaginal prolapse, primarily apex and anterior defects     Plan    Orders Placed " This Encounter   Procedures   • Ambulatory Referral to Urology     Referral Priority:   Routine     Referral Type:   Consultation     Referral Reason:   Specialty Services Required     Requested Specialty:   Urology     Number of Visits Requested:   1     Medications ordered: None    Procedures performed: Pap smear    - Mammogram: Already scheduled  - Pap screening guidelines reviewed; pap smear collected today  - Yearly clinical breast and pelvic exams recommended regardless of pap recommendations  - Dexa scan: not indicated   - Colonoscopy: already scheduled  - Healthy diet and exercise encouraged  - Calcium and Vitamin D requirements reviewed  - Contraception: N/A  - Screening: None  - We reviewed her exam findings and pelvic organ prolapse.  We reviewed computer images together.  We discussed pessary versus surgery.  The patient opted for surgical correction.  I recommend that she meet with a urogynecologist who can perform an apex suspension procedure.  Our office will coordinate that referral.    Follow up for annual visits    Philippe Garvey MD  Obstetrics and Gynecology  [facility]

## 2022-11-11 ENCOUNTER — HOSPITAL ENCOUNTER (OUTPATIENT)
Dept: GENERAL RADIOLOGY | Facility: HOSPITAL | Age: 62
Discharge: HOME | End: 2022-11-11
Admitting: FAMILY MEDICINE

## 2022-11-11 ENCOUNTER — OFFICE VISIT (OUTPATIENT)
Dept: INTERNAL MEDICINE | Facility: CLINIC | Age: 62
End: 2022-11-11

## 2022-11-11 VITALS
BODY MASS INDEX: 35.51 KG/M2 | DIASTOLIC BLOOD PRESSURE: 78 MMHG | HEART RATE: 68 BPM | SYSTOLIC BLOOD PRESSURE: 118 MMHG | HEIGHT: 64 IN | OXYGEN SATURATION: 99 % | TEMPERATURE: 98 F | WEIGHT: 208 LBS

## 2022-11-11 DIAGNOSIS — J06.9 UPPER RESPIRATORY TRACT INFECTION, UNSPECIFIED TYPE: ICD-10-CM

## 2022-11-11 DIAGNOSIS — R05.1 ACUTE COUGH: ICD-10-CM

## 2022-11-11 DIAGNOSIS — G89.29 CHRONIC LOW BACK PAIN, UNSPECIFIED BACK PAIN LATERALITY, UNSPECIFIED WHETHER SCIATICA PRESENT: Primary | ICD-10-CM

## 2022-11-11 DIAGNOSIS — J02.9 SORE THROAT: ICD-10-CM

## 2022-11-11 DIAGNOSIS — M54.50 CHRONIC LOW BACK PAIN, UNSPECIFIED BACK PAIN LATERALITY, UNSPECIFIED WHETHER SCIATICA PRESENT: Primary | ICD-10-CM

## 2022-11-11 LAB
EXPIRATION DATE: NORMAL
EXPIRATION DATE: NORMAL
FLUAV AG UPPER RESP QL IA.RAPID: NOT DETECTED
FLUBV AG UPPER RESP QL IA.RAPID: NOT DETECTED
INTERNAL CONTROL: NORMAL
INTERNAL CONTROL: NORMAL
Lab: NORMAL
Lab: NORMAL
S PYO AG THROAT QL: NEGATIVE
SARS-COV-2 AG UPPER RESP QL IA.RAPID: NOT DETECTED

## 2022-11-11 PROCEDURE — 72100 X-RAY EXAM L-S SPINE 2/3 VWS: CPT

## 2022-11-11 PROCEDURE — 87428 SARSCOV & INF VIR A&B AG IA: CPT | Performed by: FAMILY MEDICINE

## 2022-11-11 PROCEDURE — 87880 STREP A ASSAY W/OPTIC: CPT | Performed by: FAMILY MEDICINE

## 2022-11-11 PROCEDURE — 99214 OFFICE O/P EST MOD 30 MIN: CPT | Performed by: FAMILY MEDICINE

## 2022-11-11 RX ORDER — MELOXICAM 7.5 MG/1
7.5 TABLET ORAL DAILY
Qty: 30 TABLET | Refills: 0 | Status: SHIPPED | OUTPATIENT
Start: 2022-11-11

## 2022-11-11 RX ORDER — AZITHROMYCIN 250 MG/1
TABLET, FILM COATED ORAL
Qty: 6 TABLET | Refills: 0 | Status: SHIPPED | OUTPATIENT
Start: 2022-11-11 | End: 2023-01-12

## 2022-11-11 RX ORDER — TIZANIDINE 4 MG/1
4 TABLET ORAL EVERY 8 HOURS PRN
Qty: 90 TABLET | Refills: 0 | Status: SHIPPED | OUTPATIENT
Start: 2022-11-11

## 2022-11-11 NOTE — PROGRESS NOTES
"Rolanda Lao is a 62 y.o. female.    Chief Complaint   Patient presents with   • Back Pain     Has gotten worse.    • Ear Fullness   • Sore Throat       HPI   Patient has been complaining of back - lower pain for a couple of weeks. Pain is a 8 on a scale of 0-10. Pain is ache and stabbing. Pain radiates to legs. Pain is worse with coughing, better with rest. Symptoms are unchanged.  Patient has tried steroids, toradol, muscle relaxant, tylenol with little relief.  She goes to an exercise class twice a week.  Also feels like raking leaves hurt her back    Patient reports they have not been feeling well for 4day(s). She admits to dry cough, sore throat, shortness of breath, ear fullness, headache.  She denies fever, wheeze.  They have tried singulair, OTC antihistamine, allergy injections for this issue without good response.  Grandchildren have been ill.    The following portions of the patient's history were reviewed and updated as appropriate: allergies, current medications, past family history, past medical history, past social history, past surgical history and problem list.     Allergies   Allergen Reactions   • Latex Rash, Hives, Itching, Other (See Comments) and Unknown (See Comments)     blister  blisters  blisters  blisters  blister  blister   • Morphine And Related Itching, Hives and Unknown (See Comments)     \"skin crawls\" tolerates other pain meds  \"skin crawls\" tolerates other pain meds  \"skin crawls\" tolerates other pain meds         Current Outpatient Medications:   •  albuterol (PROVENTIL HFA;VENTOLIN HFA) 108 (90 BASE) MCG/ACT inhaler, Inhale 2 puffs Every 4 (Four) Hours As Needed for Wheezing., Disp: 1 inhaler, Rfl: 0  •  atorvastatin (LIPITOR) 40 MG tablet, Take 40 mg by mouth Daily., Disp: , Rfl:   •  Canagliflozin-metFORMIN HCl  MG tablet, Take  by mouth 2 (two) times a day., Disp: , Rfl:   •  clotrimazole (LOTRIMIN) 1 % cream, Prn, Disp: , Rfl:   •  dexlansoprazole (Dexilant) 60 " MG capsule, Take 60 mg by mouth Daily., Disp: , Rfl:   •  DULoxetine (CYMBALTA) 30 MG capsule, Take 1 capsule by mouth Daily., Disp: 90 capsule, Rfl: 0  •  DULoxetine (CYMBALTA) 60 MG capsule, Take 1 capsule by mouth Daily., Disp: 90 capsule, Rfl: 1  •  fexofenadine (ALLEGRA) 180 MG tablet, Take 180 mg by mouth Daily., Disp: , Rfl:   •  glimepiride (AMARYL) 1 MG tablet, Take 1 mg by mouth 2 (two) times a day., Disp: , Rfl:   •  hydroCHLOROthiazide (HYDRODIURIL) 12.5 MG tablet, Take 1 tablet by mouth Daily., Disp: 90 tablet, Rfl: 1  •  hydrOXYzine pamoate (VISTARIL) 50 MG capsule, Take 1 capsule by mouth Every Night., Disp: 90 capsule, Rfl: 0  •  methocarbamol (ROBAXIN) 500 MG tablet, Take 500 mg by mouth 4 (Four) Times a Day., Disp: , Rfl:   •  montelukast (SINGULAIR) 10 MG tablet, Take 1 tablet by mouth Every Night., Disp: 90 tablet, Rfl: 0  •  topiramate (TOPAMAX) 100 MG tablet, Take 1 tablet by mouth Daily Before Lunch., Disp: 90 tablet, Rfl: 0  •  True Metrix Blood Glucose Test test strip, , Disp: , Rfl:   •  azithromycin (Zithromax Z-Cullen) 250 MG tablet, Take 2 tablets the first day, then 1 tablet daily for 4 days., Disp: 6 tablet, Rfl: 0  •  meloxicam (Mobic) 7.5 MG tablet, Take 1 tablet by mouth Daily., Disp: 30 tablet, Rfl: 0  •  tiZANidine (ZANAFLEX) 4 MG tablet, Take 1 tablet by mouth Every 8 (Eight) Hours As Needed for Muscle Spasms., Disp: 90 tablet, Rfl: 0    ROS    Review of Systems   Constitutional: Negative for chills and fever.   HENT: Positive for sore throat.    Respiratory: Positive for cough and shortness of breath.    Cardiovascular: Negative for chest pain.   Gastrointestinal: Negative for abdominal pain, diarrhea, nausea and vomiting.   Musculoskeletal: Positive for back pain.   Neurological: Positive for headache.       Vitals:    11/11/22 1013   BP: 118/78   BP Location: Left arm   Patient Position: Sitting   Cuff Size: Adult   Pulse: 68   Temp: 98 °F (36.7 °C)   SpO2: 99%   Weight: 94.3 kg  "(208 lb)   Height: 162.6 cm (64\")   PainSc:   8     Body mass index is 35.7 kg/m².    Physical Exam     Physical Exam  Constitutional:       General: She is not in acute distress.     Appearance: Normal appearance. She is well-developed.   HENT:      Head: Normocephalic and atraumatic.      Right Ear: Tympanic membrane and external ear normal.      Left Ear: Tympanic membrane and external ear normal.      Mouth/Throat:      Pharynx: Posterior oropharyngeal erythema (PND) present.   Eyes:      Extraocular Movements: Extraocular movements intact.      Conjunctiva/sclera: Conjunctivae normal.   Cardiovascular:      Rate and Rhythm: Normal rate and regular rhythm.      Heart sounds: No murmur heard.  Pulmonary:      Effort: Pulmonary effort is normal. No respiratory distress.      Breath sounds: Normal breath sounds. No wheezing.   Abdominal:      General: Bowel sounds are normal. There is no distension.      Palpations: Abdomen is soft.      Tenderness: There is no abdominal tenderness.   Musculoskeletal:      Lumbar back: Spasms and tenderness present.   Skin:     General: Skin is warm and dry.   Neurological:      Mental Status: She is alert and oriented to person, place, and time.      Cranial Nerves: No cranial nerve deficit.   Psychiatric:         Mood and Affect: Mood normal.         Behavior: Behavior normal.         Assessment/Plan    Diagnoses and all orders for this visit:    1. Chronic low back pain, unspecified back pain laterality, unspecified whether sciatica present (Primary)  Assessment & Plan:  Encouraged to take meloxicam and zanaflex prn pain.  Encouraged stretches and exercises at home.  If no improvement, complete x-ray of the lumbar spine.     Orders:  -     XR Spine Lumbar 2 or 3 View    2. Upper respiratory tract infection, unspecified type  Assessment & Plan:  COVID, Flu, and strep negative.  May continue OTC medications.  Will treat with zithromax.       3. Sore throat  -     POCT rapid strep " A  -     POCT SARS-CoV-2 Antigen AIDE + Flu    4. Acute cough  -     POCT rapid strep A  -     POCT SARS-CoV-2 Antigen AIDE + Flu    Other orders  -     tiZANidine (ZANAFLEX) 4 MG tablet; Take 1 tablet by mouth Every 8 (Eight) Hours As Needed for Muscle Spasms.  Dispense: 90 tablet; Refill: 0  -     meloxicam (Mobic) 7.5 MG tablet; Take 1 tablet by mouth Daily.  Dispense: 30 tablet; Refill: 0  -     azithromycin (Zithromax Z-Cullen) 250 MG tablet; Take 2 tablets the first day, then 1 tablet daily for 4 days.  Dispense: 6 tablet; Refill: 0      New Medications Ordered This Visit   Medications   • tiZANidine (ZANAFLEX) 4 MG tablet     Sig: Take 1 tablet by mouth Every 8 (Eight) Hours As Needed for Muscle Spasms.     Dispense:  90 tablet     Refill:  0   • meloxicam (Mobic) 7.5 MG tablet     Sig: Take 1 tablet by mouth Daily.     Dispense:  30 tablet     Refill:  0   • azithromycin (Zithromax Z-Cullen) 250 MG tablet     Sig: Take 2 tablets the first day, then 1 tablet daily for 4 days.     Dispense:  6 tablet     Refill:  0       No orders of the defined types were placed in this encounter.      Return if symptoms worsen or fail to improve.    Eva Crane,

## 2022-11-20 PROBLEM — J06.9 UPPER RESPIRATORY TRACT INFECTION: Status: ACTIVE | Noted: 2022-11-20

## 2022-11-20 PROBLEM — G89.29 CHRONIC LOW BACK PAIN: Status: ACTIVE | Noted: 2022-11-20

## 2022-11-20 PROBLEM — M54.50 CHRONIC LOW BACK PAIN: Status: ACTIVE | Noted: 2022-11-20

## 2022-11-21 RX ORDER — BLOOD-GLUCOSE METER
1 KIT MISCELLANEOUS DAILY
Qty: 1 EACH | Refills: 0 | Status: SHIPPED | OUTPATIENT
Start: 2022-11-21 | End: 2022-11-23 | Stop reason: SDUPTHER

## 2022-11-21 NOTE — ASSESSMENT & PLAN NOTE
Encouraged to take meloxicam and zanaflex prn pain.  Encouraged stretches and exercises at home.  If no improvement, complete x-ray of the lumbar spine.

## 2022-11-23 DIAGNOSIS — E11.9 TYPE 2 DIABETES MELLITUS WITHOUT COMPLICATION, WITHOUT LONG-TERM CURRENT USE OF INSULIN: Primary | ICD-10-CM

## 2022-11-23 RX ORDER — BLOOD-GLUCOSE METER
1 KIT MISCELLANEOUS DAILY
Qty: 1 EACH | Refills: 0 | Status: SHIPPED | OUTPATIENT
Start: 2022-11-23

## 2022-11-23 RX ORDER — CALCIUM CITRATE/VITAMIN D3 200MG-6.25
1 TABLET ORAL
Qty: 100 EACH | Refills: 4 | Status: SHIPPED | OUTPATIENT
Start: 2022-11-23

## 2022-12-20 ENCOUNTER — OFFICE VISIT (OUTPATIENT)
Dept: INTERNAL MEDICINE | Facility: CLINIC | Age: 62
End: 2022-12-20

## 2022-12-20 VITALS
RESPIRATION RATE: 12 BRPM | SYSTOLIC BLOOD PRESSURE: 106 MMHG | HEART RATE: 86 BPM | HEIGHT: 64 IN | DIASTOLIC BLOOD PRESSURE: 72 MMHG | BODY MASS INDEX: 36.33 KG/M2 | WEIGHT: 212.8 LBS | TEMPERATURE: 96.9 F | OXYGEN SATURATION: 94 %

## 2022-12-20 DIAGNOSIS — J06.9 ACUTE URI: Primary | ICD-10-CM

## 2022-12-20 PROCEDURE — 99213 OFFICE O/P EST LOW 20 MIN: CPT | Performed by: NURSE PRACTITIONER

## 2022-12-20 PROCEDURE — 87428 SARSCOV & INF VIR A&B AG IA: CPT | Performed by: NURSE PRACTITIONER

## 2022-12-20 PROCEDURE — 87880 STREP A ASSAY W/OPTIC: CPT | Performed by: NURSE PRACTITIONER

## 2022-12-20 RX ORDER — DEXTROMETHORPHAN HYDROBROMIDE AND PROMETHAZINE HYDROCHLORIDE 15; 6.25 MG/5ML; MG/5ML
5 SYRUP ORAL NIGHTLY PRN
Qty: 240 ML | Refills: 0 | Status: SHIPPED | OUTPATIENT
Start: 2022-12-20 | End: 2023-01-12

## 2022-12-20 RX ORDER — AMOXICILLIN AND CLAVULANATE POTASSIUM 875; 125 MG/1; MG/1
1 TABLET, FILM COATED ORAL 2 TIMES DAILY
Qty: 14 TABLET | Refills: 0 | Status: SHIPPED | OUTPATIENT
Start: 2022-12-20 | End: 2022-12-27

## 2022-12-20 NOTE — PROGRESS NOTES
"  Office Visit      Patient Name: Rolanda Lao  : 1960   MRN: [id number]   Care Team: Patient Care Team:  Moon Bhatia APRN as PCP - General (Family Medicine)  Carmelita, Pelon MENJIVAR MD as Consulting Physician (Gastroenterology)    Chief Complaint  Sore Throat and Earache    Subjective     Subjective      Rolanda Lao presents to [facility] for sore throat.   Symptoms started about 5 days ago.   Endorses sore throat, right otalgia, cough, fatigue, body aches, and productive green sputum, and right nare has had some sanguinous drainage.   Denies fever, chills, nausea, vomiting, diarrhea, shortness of breath, chest pain, and sinus pain/tenderness.   Grandchildren have been ill with RSV.   She has tried fluticasone nasal spray and mucinex- these measures have not helped.   She is a previous smoker, quit in . No history of asthma.     Review of Systems   Constitutional: Positive for fatigue. Negative for chills and fever.   HENT: Positive for congestion and sore throat. Negative for postnasal drip, sinus pressure, sneezing, swollen glands and trouble swallowing.    Respiratory: Positive for cough. Negative for shortness of breath and wheezing.    Cardiovascular: Negative for chest pain.   Gastrointestinal: Negative for abdominal pain, diarrhea, nausea and vomiting.   Neurological: Positive for headache.   Psychiatric/Behavioral: Negative for sleep disturbance.       Objective     Objective   Vital Signs:   /72 (BP Location: Right arm, Patient Position: Sitting, Cuff Size: Adult)   Pulse 86   Temp 96.9 °F (36.1 °C) (Infrared)   Resp 12   Ht 162.6 cm (64\")   Wt 96.5 kg (212 lb 12.8 oz)   SpO2 94%   BMI 36.53 kg/m²     Physical Exam  Vitals and nursing note reviewed.   Constitutional:       General: She is not in acute distress.     Appearance: Normal appearance. She is ill-appearing. She is not toxic-appearing.   HENT:      Right Ear: Ear " canal normal. A middle ear effusion is present. Tympanic membrane is bulging.      Left Ear: Ear canal normal. A middle ear effusion is present. Tympanic membrane is not bulging.      Nose: Congestion (chest) present. No rhinorrhea.      Right Sinus: No maxillary sinus tenderness or frontal sinus tenderness.      Left Sinus: No maxillary sinus tenderness or frontal sinus tenderness.      Mouth/Throat:      Mouth: Mucous membranes are moist.      Pharynx: Posterior oropharyngeal erythema present.   Eyes:      Pupils: Pupils are equal, round, and reactive to light.   Cardiovascular:      Rate and Rhythm: Normal rate and regular rhythm.      Heart sounds: Normal heart sounds. No murmur heard.  Pulmonary:      Effort: Pulmonary effort is normal. No respiratory distress.      Breath sounds: Normal breath sounds. No wheezing.   Abdominal:      General: Bowel sounds are normal. There is no distension.      Palpations: Abdomen is soft.      Tenderness: There is no abdominal tenderness.   Musculoskeletal:      Cervical back: Neck supple. No tenderness.   Lymphadenopathy:      Head:      Right side of head: Tonsillar adenopathy present. No submental or submandibular adenopathy.      Left side of head: Tonsillar adenopathy present. No submental or submandibular adenopathy.      Cervical: No cervical adenopathy.   Skin:     General: Skin is warm and dry.      Findings: No rash.   Neurological:      Mental Status: She is alert.   Psychiatric:         Mood and Affect: Mood normal.         Behavior: Behavior normal.          Assessment / Plan      Assessment & Plan   Problem List Items Addressed This Visit    None  Visit Diagnoses     Acute URI    -  Primary    Relevant Medications    amoxicillin-clavulanate (Augmentin) 875-125 MG per tablet    Strep, COVID, and flu negative in the office today.  Advised symptoms are likely viral and self-limiting but due to worsening productive sputum and upcoming holidays will provide Augmentin  twice daily to begin should she fail to improve or worsen by Friday.  Recommend rest, push fluids, continue Mucinex, Coricidin HBP, warm liquids with honey, and follow-up as needed.         Follow Up   Return if symptoms worsen or fail to improve.  Patient was given instructions and counseling regarding her condition or for health maintenance advice. Please see specific information pulled into the AVS if appropriate.     HERMINIO Bartlett  [facility] [place]

## 2023-01-05 ENCOUNTER — TELEPHONE (OUTPATIENT)
Dept: INTERNAL MEDICINE | Facility: CLINIC | Age: 63
End: 2023-01-05
Payer: MEDICARE

## 2023-01-05 NOTE — TELEPHONE ENCOUNTER
Caller: Rolanda Lao    Relationship: Self    Best call back number: [phone]    What medication are you requesting:     What are your current symptoms: SORE THROAT, COUGH, BODY ACHES, CONGESTION    How long have you been experiencing symptoms: 3 DAYS    Have you had these symptoms before:    [] Yes  [x] No    Have you been treated for these symptoms before:   [] Yes  [x] No    If a prescription is needed, what is your preferred pharmacy and phone number:  [facility] [address] [place] [address] [phone] [facility] [phone]         Additional notes: PATIENT STATES THAT SHE TESTED POSITIVE FOR COVID

## 2023-01-12 ENCOUNTER — OFFICE VISIT (OUTPATIENT)
Dept: INTERNAL MEDICINE | Facility: CLINIC | Age: 63
End: 2023-01-12
Payer: MEDICARE

## 2023-01-12 VITALS
SYSTOLIC BLOOD PRESSURE: 120 MMHG | RESPIRATION RATE: 16 BRPM | WEIGHT: 213 LBS | OXYGEN SATURATION: 100 % | TEMPERATURE: 97.1 F | HEART RATE: 94 BPM | BODY MASS INDEX: 36.37 KG/M2 | DIASTOLIC BLOOD PRESSURE: 83 MMHG | HEIGHT: 64 IN

## 2023-01-12 DIAGNOSIS — R05.8 POST-VIRAL COUGH SYNDROME: Primary | ICD-10-CM

## 2023-01-12 PROCEDURE — 99213 OFFICE O/P EST LOW 20 MIN: CPT | Performed by: NURSE PRACTITIONER

## 2023-01-12 RX ORDER — ALBUTEROL SULFATE 90 UG/1
2 AEROSOL, METERED RESPIRATORY (INHALATION) EVERY 4 HOURS PRN
Qty: 18 G | Refills: 2 | Status: SHIPPED | OUTPATIENT
Start: 2023-01-12

## 2023-01-12 RX ORDER — DEXTROMETHORPHAN HYDROBROMIDE AND PROMETHAZINE HYDROCHLORIDE 15; 6.25 MG/5ML; MG/5ML
5 SYRUP ORAL NIGHTLY PRN
Qty: 118 ML | Refills: 0 | Status: SHIPPED | OUTPATIENT
Start: 2023-01-12

## 2023-01-23 ENCOUNTER — HOSPITAL ENCOUNTER (OUTPATIENT)
Dept: NUTRITION | Facility: HOSPITAL | Age: 63
Discharge: HOME | End: 2023-01-23
Admitting: NURSE PRACTITIONER
Payer: MEDICARE

## 2023-01-23 PROCEDURE — 97802 MEDICAL NUTRITION INDIV IN: CPT

## 2023-01-23 NOTE — PROGRESS NOTES
Adult Nutrition  Assessment/PES    Patient Name:  Rolanda Lao  YOB: 1960  MRN: [id number]  Admit Date:  1/23/2023    Assessment Date:  1/23/2023    Comments:      Met w/ pt for in-person nutrition assessment regarding type 2 diabetes diagnosis. Pt reports her most recent A1C is either 5.1% or 6.1% and wasn't certain. I checked pt's chart and her most recent A1C is 5.8% (considered well-controlled). She reports she takes metformin for her diabetes. She has also had recent wt loss of 19# since March of 2022 d/t dysphagia.  She is unable to swallow meat, bread, or pasta d/t an elective laparoscopic repair of recurrent hiatal hernia and this has left her w/ difficulty swallowing. Her diet mostly consists of soft foods high in carbohydrates like cakes, crackers, and soup. Pt would like to eat healthier and more balanced meals.     I provided pt w/ carbohydrate counting for diabetes and a soft and bite sized nutrition therapy handouts. I encouraged pt to increase protein intake w/ foods she is able to tolerate like cut deli meat and boiled eggs. I also provided pt w/ recommended range of calories, grams of protein, and grams of carbohydrates based on her current wt.     One goal was set during the session:   Increase protein intake through foods that are easily tolerated.     Pt agreeable and notes goal is feasible. Pt denies any questions/concerns at this time and has my contact information. Pt would like to set-up a follow-up appointment after meeting w/ her doctor. RD available PRN.            Nutrition/Diet History       Row Name 01/23/23 1029          Nutrition/Diet History    Typical Intake (Food/Fluid/EN/PN) 2 meals and 2 snacks/day                      Physical Findings       Row Name 01/23/23 1028          Physical Findings    Overall Physical Appearance obese                    Estimated/Assessed Needs - Anthropometrics       Row Name 01/23/23 1029          Anthropometrics    Age for  "Calculations 62     Height for Calculation 1.626 m (5' 4\")     Weight for Calculation 95.7 kg (211 lb)        Estimated/Assessed Needs    Additional Documentation Estimated Calorie Needs (Group);KCAL/KG (Group);Protein Requirements (Group);Fluid Requirements (Group)        Estimated Calorie Needs    Estimated Calorie Requirement (kcal/day) 1914        KCAL/KG    KCAL/KG 20 Kcal/Kg (kcal)     20 Kcal/Kg (kcal) 1914.18        Protein Requirements    Weight Used For Protein Calculations 95.7 kg (211 lb)     Est Protein Requirement Amount (gms/kg) 1.0 gm protein     Estimated Protein Requirements (gms/day) 95.71        Fluid Requirements    Fluid Requirements (mL/day) 1914  1mL/kcal     RDA Method (mL) 1914                           Problem/Interventions:   Problem 1       Row Name 01/23/23 1030          Nutrition Diagnoses Problem 1    Problem 1 Increased Nutrient Needs     Macronutrient Kcal;Protein     Etiology (related to) Functional Diagnosis     Functional Diagnosis Dysphagia     Signs/Symptoms (evidenced by) Report of Mnimal PO Intake  and need for oral nutritional supplement to meet increased estimated needs.                          Intervention Goal       Row Name 01/23/23 1032          Intervention Goal    General Maintain nutrition;Improved nutrition related lab(s);Reduce/improve symptoms;Meet nutritional needs for age/condition;Disease management/therapy;Provide information regarding MNT for treatment/condition     PO Establish PO;Tolerate PO;Increase intake;Maintain intake;Continue positive trend;Meet estimated needs     Weight Maintain weight                      Nutrition Prescription       Row Name 01/23/23 1033          Nutrition Prescription PO    New PO Prescription Ordered? No, recommended                    Education/Evaluation       Row Name 01/23/23 1033          Education    Education Provided education regarding     Provided education regarding Diet rationale;Healthy eating for diabetes;Key " food habit change;Medical diagnosis;Avoidance/improvement of symptoms        Monitor/Evaluation    Monitor Per protocol;PO intake;Supplement intake;Pertinent labs;Weight;Skin status;Symptoms                     Electronically signed by:  Zonia Contreras RD  01/23/23 10:34 EST

## 2023-03-11 DIAGNOSIS — F32.A DEPRESSION, UNSPECIFIED DEPRESSION TYPE: ICD-10-CM

## 2023-03-13 RX ORDER — DULOXETIN HYDROCHLORIDE 30 MG/1
CAPSULE, DELAYED RELEASE ORAL
Qty: 90 CAPSULE | Refills: 0 | Status: SHIPPED | OUTPATIENT
Start: 2023-03-13

## 2023-03-20 ENCOUNTER — TELEPHONE (OUTPATIENT)
Dept: INTERNAL MEDICINE | Facility: CLINIC | Age: 63
End: 2023-03-20

## 2023-03-20 NOTE — TELEPHONE ENCOUNTER
Caller: Rolanda Lao    Relationship: Self    Best call back number: [phone]    What medications are you currently taking:   Current Outpatient Medications on File Prior to Visit   Medication Sig Dispense Refill   • albuterol sulfate  (90 Base) MCG/ACT inhaler Inhale 2 puffs Every 4 (Four) Hours As Needed for Wheezing or Shortness of Air. 18 g 2   • atorvastatin (LIPITOR) 40 MG tablet Take 40 mg by mouth Daily.     • Canagliflozin-metFORMIN HCl  MG tablet Take  by mouth 2 (two) times a day.     • clotrimazole (LOTRIMIN) 1 % cream Prn     • dexlansoprazole (Dexilant) 60 MG capsule Take 60 mg by mouth Daily.     • DULoxetine (CYMBALTA) 30 MG capsule TAKE 1 CAPSULE EVERY DAY 90 capsule 0   • DULoxetine (CYMBALTA) 60 MG capsule Take 1 capsule by mouth Daily. 90 capsule 1   • fexofenadine (ALLEGRA) 180 MG tablet Take 180 mg by mouth Daily.     • glimepiride (AMARYL) 1 MG tablet Take 1 mg by mouth 2 (two) times a day.     • glucose monitor monitoring kit 1 each Daily. USE MACHINE ONCE A DAY AS DIRECTED 1 each 0   • hydroCHLOROthiazide (HYDRODIURIL) 12.5 MG tablet Take 1 tablet by mouth Daily. 90 tablet 1   • hydrOXYzine pamoate (VISTARIL) 50 MG capsule Take 1 capsule by mouth Every Night. 90 capsule 0   • meloxicam (Mobic) 7.5 MG tablet Take 1 tablet by mouth Daily. 30 tablet 0   • methocarbamol (ROBAXIN) 500 MG tablet Take 500 mg by mouth 4 (Four) Times a Day.     • montelukast (SINGULAIR) 10 MG tablet Take 1 tablet by mouth Every Night. 90 tablet 0   • promethazine-dextromethorphan (PROMETHAZINE-DM) 6.25-15 MG/5ML syrup Take 5 mL by mouth At Night As Needed for Cough. 118 mL 0   • tiZANidine (ZANAFLEX) 4 MG tablet Take 1 tablet by mouth Every 8 (Eight) Hours As Needed for Muscle Spasms. 90 tablet 0   • topiramate (TOPAMAX) 100 MG tablet Take 1 tablet by mouth Daily Before Lunch. 90 tablet 0   • True Metrix Blood Glucose Test test strip 1 each by Other route Daily. 100 each 4     No current  facility-administered medications on file prior to visit.        Which medication are you concerned about:     hydrOXYzine pamoate (VISTARIL) 50 MG capsule    What are your concerns:       MEDICATION CAME FROM MAIL ORDER PHARMACY ([facility]) AS 25 MG NOT 50 MG    CAN THIS BE FIXED?     CAN PATIENT TAKE TWO AT A TIME UNTIL NEW PRESCRIPTION COMES IN?

## 2023-03-31 RX ORDER — HYDROXYZINE PAMOATE 50 MG/1
CAPSULE ORAL
Qty: 30 CAPSULE | Refills: 0 | Status: SHIPPED | OUTPATIENT
Start: 2023-03-31

## 2023-03-31 NOTE — TELEPHONE ENCOUNTER
Rx Refill Note  Requested Prescriptions     Pending Prescriptions Disp Refills   • hydrOXYzine pamoate (VISTARIL) 50 MG capsule [Pharmacy Med Name: HYDROXYZINE PAMOATE 50 MG Capsule] 90 capsule 0     Sig: TAKE 1 CAPSULE EVERY NIGHT      Last office visit with prescribing clinician: 11/1/2022  Next office visit with prescribing clinician: Visit date not found     Tried calling patient to schedule annual apt but no answer left vm for her to call back and schedule apt      Jamia Parmar MA  03/31/23, 11:11 EDT

## 2023-05-10 ENCOUNTER — OFFICE VISIT (OUTPATIENT)
Dept: INTERNAL MEDICINE | Facility: CLINIC | Age: 63
End: 2023-05-10
Payer: MEDICARE

## 2023-05-10 VITALS
TEMPERATURE: 98.6 F | HEIGHT: 64 IN | SYSTOLIC BLOOD PRESSURE: 120 MMHG | DIASTOLIC BLOOD PRESSURE: 70 MMHG | OXYGEN SATURATION: 98 % | BODY MASS INDEX: 35.68 KG/M2 | HEART RATE: 91 BPM | WEIGHT: 209 LBS

## 2023-05-10 DIAGNOSIS — E11.9 TYPE 2 DIABETES MELLITUS WITHOUT COMPLICATION, WITHOUT LONG-TERM CURRENT USE OF INSULIN: ICD-10-CM

## 2023-05-10 DIAGNOSIS — F32.A DEPRESSION, UNSPECIFIED DEPRESSION TYPE: ICD-10-CM

## 2023-05-10 DIAGNOSIS — E11.65 TYPE 2 DIABETES MELLITUS WITH HYPERGLYCEMIA, WITHOUT LONG-TERM CURRENT USE OF INSULIN: ICD-10-CM

## 2023-05-10 DIAGNOSIS — M54.50 CHRONIC LOW BACK PAIN, UNSPECIFIED BACK PAIN LATERALITY, UNSPECIFIED WHETHER SCIATICA PRESENT: ICD-10-CM

## 2023-05-10 DIAGNOSIS — F41.9 ANXIETY: ICD-10-CM

## 2023-05-10 DIAGNOSIS — Z13.0 SCREENING FOR ENDOCRINE, METABOLIC AND IMMUNITY DISORDER: ICD-10-CM

## 2023-05-10 DIAGNOSIS — Z00.00 ENCOUNTER FOR SUBSEQUENT ANNUAL WELLNESS VISIT (AWV) IN MEDICARE PATIENT: Primary | ICD-10-CM

## 2023-05-10 DIAGNOSIS — Z13.220 SCREENING FOR LIPID DISORDERS: ICD-10-CM

## 2023-05-10 DIAGNOSIS — G47.33 OSA (OBSTRUCTIVE SLEEP APNEA): ICD-10-CM

## 2023-05-10 DIAGNOSIS — Z13.228 SCREENING FOR ENDOCRINE, METABOLIC AND IMMUNITY DISORDER: ICD-10-CM

## 2023-05-10 DIAGNOSIS — Z23 NEED FOR PNEUMOCOCCAL VACCINATION: ICD-10-CM

## 2023-05-10 DIAGNOSIS — Z11.59 NEED FOR HEPATITIS C SCREENING TEST: ICD-10-CM

## 2023-05-10 DIAGNOSIS — G43.909 MIGRAINE WITHOUT STATUS MIGRAINOSUS, NOT INTRACTABLE, UNSPECIFIED MIGRAINE TYPE: ICD-10-CM

## 2023-05-10 DIAGNOSIS — G89.29 CHRONIC LOW BACK PAIN, UNSPECIFIED BACK PAIN LATERALITY, UNSPECIFIED WHETHER SCIATICA PRESENT: ICD-10-CM

## 2023-05-10 DIAGNOSIS — Z13.29 SCREENING FOR ENDOCRINE, METABOLIC AND IMMUNITY DISORDER: ICD-10-CM

## 2023-05-10 LAB
POC CREATININE URINE: 200
POC MICROALBUMIN URINE: 30

## 2023-05-10 RX ORDER — OXYCODONE HYDROCHLORIDE AND ACETAMINOPHEN 5; 325 MG/1; MG/1
TABLET ORAL
COMMUNITY
Start: 2023-04-21

## 2023-05-10 RX ORDER — TRAZODONE HYDROCHLORIDE 50 MG/1
50 TABLET ORAL NIGHTLY
Qty: 30 TABLET | Refills: 1 | Status: SHIPPED | OUTPATIENT
Start: 2023-05-10

## 2023-05-10 RX ORDER — HYDROCODONE BITARTRATE AND ACETAMINOPHEN 10; 325 MG/1; MG/1
TABLET ORAL
COMMUNITY
Start: 2023-02-10

## 2023-05-10 RX ORDER — ONDANSETRON 4 MG/1
TABLET, FILM COATED ORAL
COMMUNITY
Start: 2023-03-27

## 2023-05-10 RX ORDER — GLUCOSAM/CHON-MSM1/C/MANG/BOSW 500-416.6
TABLET ORAL
COMMUNITY
Start: 2023-04-07

## 2023-05-10 NOTE — PROGRESS NOTES
The ABCs of the Annual Wellness Visit  Subsequent Medicare Wellness Visit    Subjective    Rolanda Lao is a 62 y.o. female who presents for a Subsequent Medicare Wellness Visit.    The following portions of the patient's history were reviewed and   updated as appropriate: allergies, current medications, past family history, past medical history, past social history, past surgical history and problem list.    Compared to one year ago, the patient feels her physical   health is the same.    Compared to one year ago, the patient feels her mental   health is the same.    Recent Hospitalizations:  She was not admitted to the hospital during the last year.       Current Medical Providers:  Patient Care Team:  Moon Bhatia APRN as PCP - General (Family Medicine)  Pelon Mae MD as Consulting Physician (Gastroenterology)    Outpatient Medications Prior to Visit   Medication Sig Dispense Refill   • albuterol sulfate  (90 Base) MCG/ACT inhaler Inhale 2 puffs Every 4 (Four) Hours As Needed for Wheezing or Shortness of Air. 18 g 2   • ALLERGY INJECTION SERUM OFFICE ADMINISTERED      • atorvastatin (LIPITOR) 40 MG tablet Take 40 mg by mouth Daily.     • Canagliflozin-metFORMIN HCl  MG tablet Take  by mouth 2 (two) times a day.     • clotrimazole (LOTRIMIN) 1 % cream Prn     • DULoxetine (CYMBALTA) 30 MG capsule TAKE 1 CAPSULE EVERY DAY 90 capsule 0   • DULoxetine (CYMBALTA) 60 MG capsule Take 1 capsule by mouth Daily. 90 capsule 1   • fexofenadine (ALLEGRA) 180 MG tablet Take 180 mg by mouth Daily.     • glimepiride (AMARYL) 1 MG tablet Take 1 mg by mouth 2 (two) times a day.     • glucose monitor monitoring kit 1 each Daily. USE MACHINE ONCE A DAY AS DIRECTED 1 each 0   • hydroCHLOROthiazide (HYDRODIURIL) 12.5 MG tablet Take 1 tablet by mouth Daily. 90 tablet 1   • hydrOXYzine pamoate (VISTARIL) 50 MG capsule TAKE 1 CAPSULE EVERY NIGHT 30 capsule 0   • methocarbamol (ROBAXIN) 500 MG  tablet Take 500 mg by mouth 4 (Four) Times a Day.     • montelukast (SINGULAIR) 10 MG tablet Take 1 tablet by mouth Every Night. 90 tablet 0   • ondansetron (ZOFRAN) 4 MG tablet      • oxyCODONE-acetaminophen (PERCOCET) 5-325 MG per tablet TAKE 1 TO 2 TABLETS BY MOUTH EVERY 6 HOURS AS NEEDED FOR PAIN . DO NOT EXCEED 6 PER 24 HOURS     • topiramate (TOPAMAX) 100 MG tablet Take 1 tablet by mouth Daily Before Lunch. 90 tablet 0   • True Metrix Blood Glucose Test test strip 1 each by Other route Daily. 100 each 4   • TRUEplus Lancets 33G misc      • dexlansoprazole (Dexilant) 60 MG capsule Take 60 mg by mouth Daily.     • HYDROcodone-acetaminophen (NORCO)  MG per tablet TAKE 1 TABLET BY MOUTH 4 TIMES DAILY AS NEEDED FOR PAIN     • meloxicam (Mobic) 7.5 MG tablet Take 1 tablet by mouth Daily. 30 tablet 0   • promethazine-dextromethorphan (PROMETHAZINE-DM) 6.25-15 MG/5ML syrup Take 5 mL by mouth At Night As Needed for Cough. 118 mL 0   • tiZANidine (ZANAFLEX) 4 MG tablet Take 1 tablet by mouth Every 8 (Eight) Hours As Needed for Muscle Spasms. 90 tablet 0     No facility-administered medications prior to visit.       Opioid medication/s are on active medication list.  and I have evaluated her active treatment plan and pain score trends (see table).  Vitals:    05/10/23 1237   PainSc:   6     I have reviewed the chart for potential of high risk medication and harmful drug interactions in the elderly.            Aspirin is not on active medication list.  Aspirin use is not indicated based on review of current medical condition/s. Risk of harm outweighs potential benefits.  .    Patient Active Problem List   Diagnosis   • Hypertension   • Elevated cholesterol   • Type 2 diabetes mellitus   • Sleep apnea   • Depression   • Allergic   • Acid reflux   • Migraines   • Anxiety   • Iron deficiency anemia   • Fatigue   • Obesity (BMI 30-39.9)   • Uterovaginal prolapse, incomplete   • Chronic low back pain   • Upper  "respiratory tract infection     Advance Care Planning   Advance Care Planning     Advance Directive is not on file.  ACP discussion was held with the patient during this visit. Patient does not have an advance directive, declines further assistance.     Objective    Vitals:    05/10/23 1237   BP: 120/70   Pulse: 91   Temp: 98.6 °F (37 °C)   SpO2: 98%   Weight: 94.8 kg (209 lb)   Height: 162.6 cm (64.02\")   PainSc:   6     Estimated body mass index is 35.86 kg/m² as calculated from the following:    Height as of this encounter: 162.6 cm (64.02\").    Weight as of this encounter: 94.8 kg (209 lb).    Class 2 Severe Obesity (BMI >=35 and <=39.9). Obesity-related health conditions include the following: obstructive sleep apnea, hypertension, diabetes mellitus, dyslipidemias and osteoarthritis. Obesity is unchanged. BMI is is above average; BMI management plan is completed. We discussed low calorie, low carb based diet program, portion control and increasing exercise.      Does the patient have evidence of cognitive impairment? No          HEALTH RISK ASSESSMENT    Smoking Status:  Social History     Tobacco Use   Smoking Status Former   • Packs/day: 2.00   • Years: 10.00   • Pack years: 20.00   • Types: Cigarettes   • Quit date: 2001   • Years since quittin.0   Smokeless Tobacco Never     Alcohol Consumption:  Social History     Substance and Sexual Activity   Alcohol Use No     Fall Risk Screen:    LIS Fall Risk Assessment was completed, and patient is at LOW risk for falls.Assessment completed on:5/10/2023    Depression Screenin/10/2023    12:47 PM   PHQ-2/PHQ-9 Depression Screening   Little Interest or Pleasure in Doing Things 0-->not at all   Feeling Down, Depressed or Hopeless 1-->several days   PHQ-9: Brief Depression Severity Measure Score 1       Health Habits and Functional and Cognitive Screenin/10/2023    12:48 PM   Functional & Cognitive Status   Do you have difficulty preparing " food and eating? No   Do you have difficulty bathing yourself, getting dressed or grooming yourself? No   Do you have difficulty using the toilet? No   Do you have difficulty moving around from place to place? No   Do you have trouble with steps or getting out of a bed or a chair? Yes   Current Diet Well Balanced Diet        Current Diet Comment some days of junk food   Dental Exam Up to date   Eye Exam Up to date   Exercise (times per week) 0 times per week   Current Exercises Include No Regular Exercise   Do you need help using the phone?  No   Are you deaf or do you have serious difficulty hearing?  Yes   Do you need help with transportation? No   Do you need help shopping? No   Do you need help preparing meals?  No   Do you need help with housework?  No   Do you need help with laundry? No   Do you need help taking your medications? No   Do you need help managing money? No   Do you ever drive or ride in a car without wearing a seat belt? No   Have you felt unusual stress, anger or loneliness in the last month? No   Who do you live with? Spouse   If you need help, do you have trouble finding someone available to you? No   Have you been bothered in the last four weeks by sexual problems? No   Do you have difficulty concentrating, remembering or making decisions? Yes       Age-appropriate Screening Schedule:  Refer to the list below for future screening recommendations based on patient's age, sex and/or medical conditions. Orders for these recommended tests are listed in the plan section. The patient has been provided with a written plan.    Health Maintenance   Topic Date Due   • MAMMOGRAM  Never done   • ZOSTER VACCINE (1 of 2) Never done   • HEPATITIS C SCREENING  Never done   • LIPID PANEL  04/28/2022   • HEMOGLOBIN A1C  05/01/2023   • INFLUENZA VACCINE  08/01/2023   • COLORECTAL CANCER SCREENING  11/12/2023   • DIABETIC EYE EXAM  12/01/2023   • DIABETIC FOOT EXAM  03/14/2024   • ANNUAL WELLNESS VISIT   05/10/2024   • URINE MICROALBUMIN  05/10/2024   • PAP SMEAR  11/02/2025   • TDAP/TD VACCINES (2 - Td or Tdap) 10/20/2026   • COVID-19 Vaccine  Completed   • Pneumococcal Vaccine 0-64  Completed                  CMS Preventative Services Quick Reference  Risk Factors Identified During Encounter  Chronic Pain: Natural history and expected course discussed. Questions answered.  Immunizations Discussed/Encouraged: Shingrix  Inactivity/Sedentary: Patient was advised to exercise at least 150 minutes a week per CDC recommendations.  Polypharmacy: Medication List reviewed and Medications are appropriate for patient  The above risks/problems have been discussed with the patient.  Pertinent information has been shared with the patient in the After Visit Summary.  An After Visit Summary and PPPS were made available to the patient.    Follow Up:   Next Medicare Wellness visit to be scheduled in 1 year.       Additional E&M Note during same encounter follows:  Patient has multiple medical problems which are significant and separately identifiable that require additional work above and beyond the Medicare Wellness Visit.      Chief Complaint  Medicare Wellness-subsequent    Subjective        HPI  Rolanda Lao is also being seen today for follow-up of hypertension, hyperlipidemia, T2DM.    Hysterectomy w/bladder tack, hernia repair since last appointment.    Mammogram on 5/2/23, copy scanned into chart.      T2DM: Taking glimepiride, canagliflozin-metformin as prescribed.  Trying to eat a low-carb, heart healthy diet. Denies chest pain, dyspnea, orthopnea, palpitations, lower extremity edema, confusion, headaches, weakness, visual disturbances.    Follows podiatrist. Was told her foot is flat, looks like it will collapse.      LIZA, diagnosed years ago.  Wore CPAP for a while.  Was advised to take humidifier off, making her cough.  Woke up last night feeling the need to take a deep breath.    Hypertension, Hyperlipidemia:  "Taking atorvastatin, HCTZ as prescribed. Denies chest pain, dyspnea, orthopnea, palpitations, lower extremity edema, confusion, headaches, weakness, visual disturbances.       Objective   Vital Signs:  /70   Pulse 91   Temp 98.6 °F (37 °C)   Ht 162.6 cm (64.02\")   Wt 94.8 kg (209 lb)   SpO2 98%   BMI 35.86 kg/m²     Physical Exam  Constitutional:       Appearance: She is obese. She is not ill-appearing.   HENT:      Head: Normocephalic.      Right Ear: External ear normal.      Left Ear: External ear normal.   Eyes:      Conjunctiva/sclera: Conjunctivae normal.      Pupils: Pupils are equal, round, and reactive to light.   Cardiovascular:      Rate and Rhythm: Normal rate and regular rhythm.      Pulses:           Radial pulses are 2+ on the right side and 2+ on the left side.        Dorsalis pedis pulses are 2+ on the right side and 2+ on the left side.      Heart sounds: Normal heart sounds.   Pulmonary:      Effort: Pulmonary effort is normal.      Breath sounds: Normal breath sounds.   Musculoskeletal:      Cervical back: Normal range of motion and neck supple.      Right lower leg: No edema.      Left lower leg: No edema.   Skin:     General: Skin is warm.      Capillary Refill: Capillary refill takes less than 2 seconds.   Neurological:      Mental Status: She is alert and oriented to person, place, and time.      Coordination: Coordination normal.      Gait: Gait normal.   Psychiatric:         Mood and Affect: Mood normal.         Behavior: Behavior normal.         Thought Content: Thought content normal.                         Assessment and Plan   Diagnoses and all orders for this visit:    1. Encounter for subsequent annual wellness visit (AWV) in Medicare patient (Primary)    2. Depression, unspecified depression type        - Encouraged to take part in daily physical exercise.          - Eat healthy, well balanced diet; avoid sugary foods or beverages        - Limit alcohol intake        " - Ensure good night's sleep by creating calm space in bedroom, avoiding screen time 1-2 hours before bed, no caffeine after 5 pm        - Talk to supportive family and friends, as needed        - Consider journaling, other creative way to express feelings, if needed    3.  Chronic low back pain, unspecified back pain laterality, unspecified whether sciatica present    4. Anxiety        - Encouraged to take part in daily physical exercise.          - Eat healthy, well balanced diet; avoid sugary foods or beverages        - Limit alcohol intake        - Ensure good night's sleep by creating calm space in bedroom, avoiding screen time 1-2 hours before bed, no caffeine after 5 pm        - Talk to supportive family and friends, as needed        - Consider journaling, other creative way to express feelings, if needed    5. Migraine without status migrainosus, not intractable, unspecified migraine type          - Avoid known triggers when possible    6. Type 2 diabetes mellitus with hyperglycemia, without long-term current use of insulin        - Follow diabetic diet        - Monitor blood sugars as discussed, to better assess trends and glycemic response to certain food, drink, activities.  Advised fasting blood glucose should be < 130, 2 hours post-prandial should be < 180        - See eye doctor annually or as discussed        - Wear protective foot wear/no bare feet        - Check feet regularly for calluses or ulcers        - Discussed risk of poorly controlled diabetes and long-term complications        - Exercise as tolerated for 30-45 minutes most days of the week. Gradually increase daily exercise if not currently active.        - Take all medications as prescribed    7. Need for hepatitis C screening test  -     Hepatitis C Antibody    8. Screening for lipid disorders  -     Lipid Panel    9. LIZA (obstructive sleep apnea)  -     Ambulatory Referral to Neurology    10. Screening for endocrine, metabolic and  immunity disorder  -     Comprehensive Metabolic Panel    11. Need for pneumococcal vaccination  -     Pneumococcal Conjugate Vaccine 20-Valent (PCV20)               Follow Up   Return in about 4 weeks (around 6/7/2023) for Next scheduled follow up.  Patient was given instructions and counseling regarding her condition or for health maintenance advice. Please see specific information pulled into the AVS if appropriate.

## 2023-05-25 RX ORDER — MONTELUKAST SODIUM 10 MG/1
10 TABLET ORAL NIGHTLY
Qty: 90 TABLET | Refills: 3 | Status: SHIPPED | OUTPATIENT
Start: 2023-05-25

## 2023-05-26 RX ORDER — TOPIRAMATE 100 MG/1
TABLET, FILM COATED ORAL
Qty: 90 TABLET | Refills: 1 | Status: SHIPPED | OUTPATIENT
Start: 2023-05-26

## 2023-05-30 ENCOUNTER — TELEPHONE (OUTPATIENT)
Dept: INTERNAL MEDICINE | Facility: CLINIC | Age: 63
End: 2023-05-30

## 2023-05-30 NOTE — TELEPHONE ENCOUNTER
Caller: Rolanda Lao    Relationship: Self    Best call back number:[phone]    What is the best time to reach you:ANY     Who are you requesting to speak with (clinical staff, provider,  specific staff member):CLINICAL STAFF    What was the call regarding: PATIENT CALLED REGARDING THE PAPERWORK THAT WAS SUPPOSED TO BE SENT TO THE PEDIATRIST AND STATES THAT THEY INFORMED HER THEY DID NOT RECEIVE THIS PAPERWORK     PATIENT ALSO WOULD LIKE TO KNOW THE RESULTS OF HER MOST RECENT LABS     PLEASE CALL PATIENT

## 2023-05-31 PROBLEM — J06.9 UPPER RESPIRATORY TRACT INFECTION: Status: RESOLVED | Noted: 2022-11-20 | Resolved: 2023-05-31

## 2023-05-31 NOTE — TELEPHONE ENCOUNTER
Labs are scanned into chart, not sure why they're not pulling in on Radha's orders. Reviewed scanned labs from 5/11/23.    SCANNED - LABS (05/11/2023)    Creatinine higher than last check (was 0.64 3/29/22). Calcium high at 10.4. I suggest she have that lab rechecked by radha next visit. Stay hydrated, if taking calcium supplement stop taking. We've had some issues with high calciums and creatinines on labs so that could be a lab error but still safe thing to do is recheck next time she's in. Doesn't require fasting.    Total cholesterol 219 (goal under 200), LDL (bad cholesterol) high at 144 (goal under 100). Continue atorvastatin.     A1C 6.4. continue current medicine for diabetes mellitus, good control (goal A1C under 7).    Hep C screening negative/normal.

## 2023-06-14 RX ORDER — HYDROCHLOROTHIAZIDE 12.5 MG/1
TABLET ORAL
Qty: 90 TABLET | Refills: 0 | Status: SHIPPED | OUTPATIENT
Start: 2023-06-14

## 2023-06-14 RX ORDER — ATORVASTATIN CALCIUM 40 MG/1
40 TABLET, FILM COATED ORAL DAILY
Qty: 90 TABLET | Refills: 1 | Status: SHIPPED | OUTPATIENT
Start: 2023-06-14

## 2023-06-14 RX ORDER — GLIMEPIRIDE 1 MG/1
1 TABLET ORAL
Qty: 90 TABLET | Refills: 1 | Status: SHIPPED | OUTPATIENT
Start: 2023-06-14

## 2023-06-15 ENCOUNTER — TELEPHONE (OUTPATIENT)
Dept: INTERNAL MEDICINE | Facility: CLINIC | Age: 63
End: 2023-06-15
Payer: MEDICARE

## 2023-06-15 RX ORDER — TRAZODONE HYDROCHLORIDE 50 MG/1
50 TABLET ORAL NIGHTLY
Qty: 30 TABLET | Refills: 1 | Status: SHIPPED | OUTPATIENT
Start: 2023-06-15

## 2023-06-15 NOTE — TELEPHONE ENCOUNTER
Caller: Rolanda Lao    Relationship: Self    Best call back number:  [phone]    Requested Prescriptions:   Requested Prescriptions      No prescriptions requested or ordered in this encounter        traZODone (DESYREL) 50 MG tablet     Pharmacy where request should be sent:      [facility] - [facility] [address] - [phone] PH - [phone] FX     Last office visit with prescribing clinician: 5/10/2023   Last telemedicine visit with prescribing clinician: Visit date not found   Next office visit with prescribing clinician: 7/14/2023     Additional details provided by patient:     WOULD LIKE A 90 DAYS SUPPLY    Does the patient have less than a 3 day supply:  [] Yes  [x] No    Would you like a call back once the refill request has been completed: [] Yes [x] No    If the office needs to give you a call back, can they leave a voicemail: [] Yes [x] No    Julia Jacobsen, PCT   06/15/23 09:04 EDT

## 2023-07-24 ENCOUNTER — TELEPHONE (OUTPATIENT)
Dept: INTERNAL MEDICINE | Facility: CLINIC | Age: 63
End: 2023-07-24
Payer: MEDICARE

## 2023-07-24 DIAGNOSIS — E11.9 TYPE 2 DIABETES MELLITUS WITHOUT COMPLICATION, WITHOUT LONG-TERM CURRENT USE OF INSULIN: ICD-10-CM

## 2023-07-24 DIAGNOSIS — E11.65 TYPE 2 DIABETES MELLITUS WITH HYPERGLYCEMIA, WITHOUT LONG-TERM CURRENT USE OF INSULIN: Primary | ICD-10-CM

## 2023-07-24 RX ORDER — DIPHENHYDRAMINE HCL 25 MG
TABLET ORAL
Qty: 1 KIT | OUTPATIENT
Start: 2023-07-24

## 2023-07-24 RX ORDER — HYDROXYZINE PAMOATE 50 MG/1
CAPSULE ORAL
Qty: 60 CAPSULE | OUTPATIENT
Start: 2023-07-24

## 2023-07-24 NOTE — TELEPHONE ENCOUNTER
Caller: Rolanda Lao    Relationship: Self    Best call back number: [phone]     Requested Prescriptions:       Canagliflozin-metFORMIN HCl  MG tablet          Pharmacy where request should be sent: [facility] [address] [place] [address] [phone] [facility] [phone] FX     Last office visit with prescribing clinician: 5/10/2023   Last telemedicine visit with prescribing clinician: Visit date not found   Next office visit with prescribing clinician: 8/15/2023     Additional details provided by patient: PATIENT REQUESTING PARTIAL UNTIL THE 30TH WHEN HER MAIL ORDER PHARMACY CAN REFILL.    PATIENT STATED SHE IS OUT.    Does the patient have less than a 3 day supply:  [x] Yes  [] No    Would you like a call back once the refill request has been completed: [] Yes [x] No    If the office needs to give you a call back, can they leave a voicemail: [] Yes [x] No    Steven Hardin Rep   07/24/23 08:25 EDT

## 2023-07-24 NOTE — TELEPHONE ENCOUNTER
PHONE CALL FROM PATIENT.  SHE TAKES MEDICATION TWICE DAILY AND NEEDS A NEW PRESCRIPTION.  OUT OF MEDICATION.      PHARMACY:  [facility]    CALL IF NEEDED [phone]

## 2023-07-25 ENCOUNTER — TELEPHONE (OUTPATIENT)
Dept: INTERNAL MEDICINE | Facility: CLINIC | Age: 63
End: 2023-07-25
Payer: MEDICARE

## 2023-07-25 DIAGNOSIS — E11.65 TYPE 2 DIABETES MELLITUS WITH HYPERGLYCEMIA, WITHOUT LONG-TERM CURRENT USE OF INSULIN: ICD-10-CM

## 2023-07-25 NOTE — TELEPHONE ENCOUNTER
Caller: Rolanda Lao    Relationship: Self    Best call back number: [phone]     Requested Prescriptions:   Requested Prescriptions      No prescriptions requested or ordered in this encounter      Canagliflozin-metFORMIN HCl  MG tablet     Pharmacy where request should be sent: [facility] [address] [place] [address] [phone] [facility] [phone]      Last office visit with prescribing clinician: 5/10/2023   Last telemedicine visit with prescribing clinician: Visit date not found   Next office visit with prescribing clinician: 8/15/2023     Additional details provided by patient: PATIENT SAID THIS IS SUPPOSE TO SAY TWICE A DAY AND IT SAYS ONCE A DAY AND THE INSURANCE DENIED IT   PATIENT IS OUT OF THE MEDICATION AND IT IS NOT DUE UNTIL 8-4-23 BUT DOESN'T KNOW HOW SHE RAN OUT SO QUICKLY     Does the patient have less than a 3 day supply:  [x] Yes  [] No    Would you like a call back once the refill request has been completed: [] Yes [x] No    If the office needs to give you a call back, can they leave a voicemail: [] Yes [x] No

## 2023-07-25 NOTE — TELEPHONE ENCOUNTER
I tried calling the patient but na, I called pharmacy to see if Canagliflozin-metforminHCI  mg was ever written for BID. Pharmacy states it was by Dr. Kathy Mojica in [place], [place]. This must have been the patient's Old PCP. Would you want to change the direction for the medication?

## 2023-08-02 ENCOUNTER — TELEPHONE (OUTPATIENT)
Dept: INTERNAL MEDICINE | Facility: CLINIC | Age: 63
End: 2023-08-02
Payer: MEDICARE

## 2023-08-02 RX ORDER — FLUCONAZOLE 150 MG/1
150 TABLET ORAL
Qty: 2 TABLET | Refills: 0 | Status: SHIPPED | OUTPATIENT
Start: 2023-08-02 | End: 2023-08-08

## 2023-08-15 ENCOUNTER — OFFICE VISIT (OUTPATIENT)
Dept: INTERNAL MEDICINE | Facility: CLINIC | Age: 63
End: 2023-08-15
Payer: MEDICARE

## 2023-08-15 VITALS
HEART RATE: 82 BPM | HEIGHT: 64 IN | DIASTOLIC BLOOD PRESSURE: 62 MMHG | WEIGHT: 206 LBS | TEMPERATURE: 98.2 F | OXYGEN SATURATION: 98 % | SYSTOLIC BLOOD PRESSURE: 100 MMHG | BODY MASS INDEX: 35.17 KG/M2

## 2023-08-15 DIAGNOSIS — F32.A DEPRESSION, UNSPECIFIED DEPRESSION TYPE: ICD-10-CM

## 2023-08-15 DIAGNOSIS — F41.9 ANXIETY: ICD-10-CM

## 2023-08-15 DIAGNOSIS — E11.65 TYPE 2 DIABETES MELLITUS WITH HYPERGLYCEMIA, WITHOUT LONG-TERM CURRENT USE OF INSULIN: Primary | ICD-10-CM

## 2023-08-15 LAB
EXPIRATION DATE: ABNORMAL
HBA1C MFR BLD: 6.4 %
Lab: ABNORMAL

## 2023-08-15 PROCEDURE — 3044F HG A1C LEVEL LT 7.0%: CPT | Performed by: NURSE PRACTITIONER

## 2023-08-15 PROCEDURE — 83036 HEMOGLOBIN GLYCOSYLATED A1C: CPT | Performed by: NURSE PRACTITIONER

## 2023-08-15 PROCEDURE — 3078F DIAST BP <80 MM HG: CPT | Performed by: NURSE PRACTITIONER

## 2023-08-15 PROCEDURE — 1159F MED LIST DOCD IN RCRD: CPT | Performed by: NURSE PRACTITIONER

## 2023-08-15 PROCEDURE — 1160F RVW MEDS BY RX/DR IN RCRD: CPT | Performed by: NURSE PRACTITIONER

## 2023-08-15 PROCEDURE — 99214 OFFICE O/P EST MOD 30 MIN: CPT | Performed by: NURSE PRACTITIONER

## 2023-08-15 PROCEDURE — 3074F SYST BP LT 130 MM HG: CPT | Performed by: NURSE PRACTITIONER

## 2023-08-15 RX ORDER — LEVOCETIRIZINE DIHYDROCHLORIDE 5 MG/1
TABLET, FILM COATED ORAL
COMMUNITY
Start: 2023-07-25 | End: 2023-08-15

## 2023-08-22 ENCOUNTER — TELEPHONE (OUTPATIENT)
Dept: INTERNAL MEDICINE | Facility: CLINIC | Age: 63
End: 2023-08-22
Payer: MEDICARE

## 2023-08-22 DIAGNOSIS — G47.33 OSA (OBSTRUCTIVE SLEEP APNEA): ICD-10-CM

## 2023-08-22 DIAGNOSIS — E11.65 TYPE 2 DIABETES MELLITUS WITH HYPERGLYCEMIA, WITHOUT LONG-TERM CURRENT USE OF INSULIN: ICD-10-CM

## 2023-08-22 DIAGNOSIS — F51.04 PSYCHOPHYSIOLOGICAL INSOMNIA: Primary | ICD-10-CM

## 2023-08-22 RX ORDER — TRAZODONE HYDROCHLORIDE 300 MG/1
300 TABLET ORAL NIGHTLY
Qty: 90 TABLET | Refills: 1 | Status: SHIPPED | OUTPATIENT
Start: 2023-08-22

## 2023-08-22 NOTE — TELEPHONE ENCOUNTER
Caller: Rolanda Lao    Relationship to patient: Self    Best call back number: [phone]    Patient is needing: PATIENT STATED THAT SHE WOULD LIKE TO SPEAK WITH CLINICAL ABOUT HER TRAZODONE MEDICATION IT IS NOT WORKING AND WANTED TO SEE WHAT TO DO    PLEASE ADVISE

## 2023-08-22 NOTE — TELEPHONE ENCOUNTER
Patient said that she takes med about 8:30. Tries to lay down at 9 pm. Last night she was still awake at 10:30. Woke up around 11 pm and was awake until around 12. Woke up between 2-3 am and has been wake ever since. This is a typical night. Taking 1.5 tabs nightly.

## 2023-08-22 NOTE — TELEPHONE ENCOUNTER
Patient will try increased dosage. Will let us know if it works and she needs a new rx sent in. Tried to set up appointment with the Dr who handles her CPAP and they have moved. Would like another referral to someone in [place] since she lives in [place].

## 2023-09-05 ENCOUNTER — TELEPHONE (OUTPATIENT)
Dept: INTERNAL MEDICINE | Facility: CLINIC | Age: 63
End: 2023-09-05
Payer: MEDICARE

## 2023-09-05 RX ORDER — TIZANIDINE HYDROCHLORIDE 4 MG/1
4 CAPSULE, GELATIN COATED ORAL NIGHTLY PRN
Qty: 30 CAPSULE | Refills: 1 | Status: SHIPPED | OUTPATIENT
Start: 2023-09-05

## 2023-09-05 NOTE — TELEPHONE ENCOUNTER
Caller: Rolanda Lao    Relationship: Self    Best call back number: [phone]    What medication are you requesting: MUSCLE RELAXER    What are your current symptoms: BACK PAIN    Have you had these symptoms before:    [x] Yes  [] No    Have you been treated for these symptoms before:   [x] Yes  [] No    If a prescription is needed, what is your preferred pharmacy and phone number: [facility] - [place], [place] - [address] [phone] [facility] [phone]      Additional notes: PATIENT WAS PRESCRIBED  MUSCLE RELAXER'S 2 MONTHS AGO BUT DOESN'T REMEMBER NAME

## 2023-10-10 DIAGNOSIS — E11.9 TYPE 2 DIABETES MELLITUS WITHOUT COMPLICATION, WITHOUT LONG-TERM CURRENT USE OF INSULIN: ICD-10-CM

## 2023-10-10 RX ORDER — CALCIUM CITRATE/VITAMIN D3 200MG-6.25
1 TABLET ORAL
Qty: 100 EACH | Refills: 4 | Status: SHIPPED | OUTPATIENT
Start: 2023-10-10

## 2023-11-02 DIAGNOSIS — E11.65 TYPE 2 DIABETES MELLITUS WITH HYPERGLYCEMIA, WITHOUT LONG-TERM CURRENT USE OF INSULIN: ICD-10-CM

## 2023-11-03 RX ORDER — SEMAGLUTIDE 0.68 MG/ML
INJECTION, SOLUTION SUBCUTANEOUS
Qty: 3 ML | Refills: 10 | Status: SHIPPED | OUTPATIENT
Start: 2023-11-03

## 2023-11-08 RX ORDER — GLIMEPIRIDE 1 MG/1
1 TABLET ORAL
Qty: 90 TABLET | Refills: 10 | Status: SHIPPED | OUTPATIENT
Start: 2023-11-08

## 2023-11-08 RX ORDER — ATORVASTATIN CALCIUM 40 MG/1
40 TABLET, FILM COATED ORAL DAILY
Qty: 90 TABLET | Refills: 10 | Status: SHIPPED | OUTPATIENT
Start: 2023-11-08

## 2023-12-17 DIAGNOSIS — E11.9 TYPE 2 DIABETES MELLITUS WITHOUT COMPLICATION, WITHOUT LONG-TERM CURRENT USE OF INSULIN: ICD-10-CM

## 2023-12-18 RX ORDER — CALCIUM CITRATE/VITAMIN D3 200MG-6.25
1 TABLET ORAL DAILY
Refills: 3 | OUTPATIENT
Start: 2023-12-18

## 2024-02-01 DIAGNOSIS — F51.04 PSYCHOPHYSIOLOGICAL INSOMNIA: ICD-10-CM

## 2024-02-01 RX ORDER — TRAZODONE HYDROCHLORIDE 300 MG/1
300 TABLET ORAL NIGHTLY
Qty: 90 TABLET | Refills: 3 | OUTPATIENT
Start: 2024-02-01

## 2024-04-22 RX ORDER — MONTELUKAST SODIUM 10 MG/1
10 TABLET ORAL NIGHTLY
Qty: 90 TABLET | Refills: 3 | OUTPATIENT
Start: 2024-04-22

## 2024-06-11 ENCOUNTER — APPOINTMENT (OUTPATIENT)
Dept: GENERAL RADIOLOGY | Facility: HOSPITAL | Age: 64
End: 2024-06-11
Payer: COMMERCIAL

## 2024-06-11 ENCOUNTER — HOSPITAL ENCOUNTER (EMERGENCY)
Facility: HOSPITAL | Age: 64
Discharge: HOME | End: 2024-06-11
Attending: STUDENT IN AN ORGANIZED HEALTH CARE EDUCATION/TRAINING PROGRAM | Admitting: STUDENT IN AN ORGANIZED HEALTH CARE EDUCATION/TRAINING PROGRAM
Payer: COMMERCIAL

## 2024-06-11 VITALS
TEMPERATURE: 97.8 F | DIASTOLIC BLOOD PRESSURE: 64 MMHG | WEIGHT: 217 LBS | HEART RATE: 98 BPM | SYSTOLIC BLOOD PRESSURE: 107 MMHG | OXYGEN SATURATION: 98 % | HEIGHT: 64 IN | BODY MASS INDEX: 37.05 KG/M2 | RESPIRATION RATE: 16 BRPM

## 2024-06-11 DIAGNOSIS — M25.511 RIGHT SHOULDER PAIN, UNSPECIFIED CHRONICITY: Primary | ICD-10-CM

## 2024-06-11 PROCEDURE — 73030 X-RAY EXAM OF SHOULDER: CPT

## 2024-06-11 PROCEDURE — 25010000002 KETOROLAC TROMETHAMINE PER 15 MG: Performed by: NURSE PRACTITIONER

## 2024-06-11 PROCEDURE — 96372 THER/PROPH/DIAG INJ SC/IM: CPT

## 2024-06-11 PROCEDURE — 99283 EMERGENCY DEPT VISIT LOW MDM: CPT

## 2024-06-11 RX ORDER — KETOROLAC TROMETHAMINE 30 MG/ML
30 INJECTION, SOLUTION INTRAMUSCULAR; INTRAVENOUS ONCE
Status: COMPLETED | OUTPATIENT
Start: 2024-06-11 | End: 2024-06-11

## 2024-06-11 RX ADMIN — KETOROLAC TROMETHAMINE 30 MG: 30 INJECTION, SOLUTION INTRAMUSCULAR; INTRAVENOUS at 15:51
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
## 2024-06-11 NOTE — DISCHARGE INSTRUCTIONS
Please follow-up with PCP in 1 week.  Follow-up with orthopedics.  Follow-up ER for any worse symptoms.

## 2024-08-19 NOTE — TELEPHONE ENCOUNTER
Rx Refill Note  Requested Prescriptions     Pending Prescriptions Disp Refills   • topiramate (TOPAMAX) 100 MG tablet [Pharmacy Med Name: TOPIRAMATE 100 MG Tablet] 90 tablet 0     Sig: TAKE 1 TABLET EVERY DAY BEFORE LUNCH      Last office visit with prescribing clinician: 5/10/2023   Last telemedicine visit with prescribing clinician: Visit date not found   Next office visit with prescribing clinician: 5/25/2023      Home

## 2024-10-17 ENCOUNTER — OFFICE VISIT (OUTPATIENT)
Dept: ORTHOPEDIC SURGERY | Facility: CLINIC | Age: 64
End: 2024-10-17
Payer: COMMERCIAL

## 2024-10-17 VITALS
WEIGHT: 225.8 LBS | HEIGHT: 64 IN | DIASTOLIC BLOOD PRESSURE: 74 MMHG | BODY MASS INDEX: 38.55 KG/M2 | SYSTOLIC BLOOD PRESSURE: 126 MMHG

## 2024-10-17 DIAGNOSIS — M75.101 RIGHT ROTATOR CUFF TEAR ARTHROPATHY: ICD-10-CM

## 2024-10-17 DIAGNOSIS — M25.511 CHRONIC RIGHT SHOULDER PAIN: Primary | ICD-10-CM

## 2024-10-17 DIAGNOSIS — M12.811 RIGHT ROTATOR CUFF TEAR ARTHROPATHY: ICD-10-CM

## 2024-10-17 DIAGNOSIS — G89.29 CHRONIC RIGHT SHOULDER PAIN: Primary | ICD-10-CM

## 2024-10-17 PROCEDURE — 99204 OFFICE O/P NEW MOD 45 MIN: CPT | Performed by: ORTHOPAEDIC SURGERY

## 2024-10-17 RX ORDER — CYCLOBENZAPRINE HCL 10 MG
1 TABLET ORAL 3 TIMES DAILY PRN
COMMUNITY
Start: 2024-04-11

## 2024-10-17 RX ORDER — BUSPIRONE HYDROCHLORIDE 5 MG/1
5 TABLET ORAL 2 TIMES DAILY
COMMUNITY

## 2024-10-17 RX ORDER — DULOXETIN HYDROCHLORIDE 30 MG/1
CAPSULE, DELAYED RELEASE ORAL
COMMUNITY
Start: 2024-01-17

## 2024-10-17 RX ORDER — MOMETASONE FUROATE MONOHYDRATE 50 UG/1
SPRAY, METERED NASAL DAILY
COMMUNITY
Start: 2024-03-08

## 2024-10-17 RX ORDER — EZETIMIBE 10 MG/1
TABLET ORAL
COMMUNITY
Start: 2024-10-15

## 2024-10-17 RX ORDER — LEVOCETIRIZINE DIHYDROCHLORIDE 5 MG/1
5 TABLET, FILM COATED ORAL
COMMUNITY
Start: 2024-01-17

## 2024-10-17 RX ORDER — ASCORBIC ACID 500 MG
TABLET ORAL
COMMUNITY
Start: 2024-10-12

## 2024-10-17 RX ORDER — ACETAMINOPHEN 500 MG
1000 TABLET ORAL EVERY 6 HOURS PRN
COMMUNITY

## 2024-10-17 RX ORDER — METHOCARBAMOL 500 MG/1
500 TABLET, FILM COATED ORAL
COMMUNITY

## 2024-10-17 RX ORDER — HYDROXYZINE HYDROCHLORIDE 50 MG/1
50 TABLET, FILM COATED ORAL
COMMUNITY
Start: 2024-08-13

## 2024-10-17 RX ORDER — AMLODIPINE BESYLATE 2.5 MG/1
TABLET ORAL
COMMUNITY
Start: 2024-04-11

## 2024-10-17 RX ORDER — ISOPROPYL ALCOHOL 70 ML/100ML
SWAB TOPICAL
COMMUNITY
Start: 2024-10-03

## 2024-10-17 RX ORDER — GABAPENTIN 300 MG/1
300 CAPSULE ORAL 3 TIMES DAILY
COMMUNITY
Start: 2024-08-13
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
## 2024-10-21 DIAGNOSIS — E11.9 TYPE 2 DIABETES MELLITUS WITHOUT COMPLICATION, WITHOUT LONG-TERM CURRENT USE OF INSULIN: ICD-10-CM

## 2024-10-21 RX ORDER — CALCIUM CITRATE/VITAMIN D3 200MG-6.25
1 TABLET ORAL DAILY
Refills: 3 | OUTPATIENT
Start: 2024-10-21

## 2025-01-13 ENCOUNTER — OFFICE VISIT (OUTPATIENT)
Dept: NEUROLOGY | Facility: CLINIC | Age: 65
End: 2025-01-13
Payer: MEDICARE

## 2025-01-13 VITALS
BODY MASS INDEX: 38.31 KG/M2 | SYSTOLIC BLOOD PRESSURE: 130 MMHG | HEART RATE: 80 BPM | OXYGEN SATURATION: 98 % | HEIGHT: 64 IN | DIASTOLIC BLOOD PRESSURE: 86 MMHG | WEIGHT: 224.4 LBS

## 2025-01-13 DIAGNOSIS — R41.3 MEMORY LOSS: Primary | ICD-10-CM

## 2025-01-13 PROCEDURE — 99214 OFFICE O/P EST MOD 30 MIN: CPT | Performed by: NURSE PRACTITIONER

## 2025-01-13 PROCEDURE — 3075F SYST BP GE 130 - 139MM HG: CPT | Performed by: NURSE PRACTITIONER

## 2025-01-13 PROCEDURE — 3079F DIAST BP 80-89 MM HG: CPT | Performed by: NURSE PRACTITIONER

## 2025-01-13 PROCEDURE — 1159F MED LIST DOCD IN RCRD: CPT | Performed by: NURSE PRACTITIONER

## 2025-01-13 PROCEDURE — 1160F RVW MEDS BY RX/DR IN RCRD: CPT | Performed by: NURSE PRACTITIONER

## 2025-01-13 RX ORDER — GLIPIZIDE 5 MG/1
5 TABLET ORAL
COMMUNITY
Start: 2024-11-25 | End: 2025-01-13

## 2025-01-13 RX ORDER — TRAMADOL HYDROCHLORIDE 50 MG/1
50 TABLET ORAL EVERY 8 HOURS PRN
COMMUNITY
Start: 2024-11-18 | End: 2025-01-13

## 2025-01-13 RX ORDER — CICLOPIROX 80 MG/ML
SOLUTION TOPICAL NIGHTLY
COMMUNITY
Start: 2024-12-18 | End: 2025-01-13

## 2025-01-13 RX ORDER — DICLOFENAC SODIUM 75 MG/1
75 TABLET, DELAYED RELEASE ORAL
COMMUNITY
Start: 2024-11-21 | End: 2025-01-13

## 2025-01-13 NOTE — LETTER
2025     HERMINIO Simmons  [address]  [facility] [address]    Patient: Rolanda Lao   YOB: 1960   Date of Visit: 2025     Dear HERMINIO Simmons:       Thank you for referring Rolanda Lao to me for evaluation. Below are the relevant portions of my assessment and plan of care.    If you have questions, please do not hesitate to call me. I look forward to following Rolanda along with you.         Sincerely,        Juana Bender DNP, APRN        CC: No Recipients    Juana Bender DNP, APRN  [phone]  Signed     Neuro Office Visit      Encounter Date: 2025   Patient Name: Rolanda Lao  : 1960   MRN: [id number]   PCP: HERMINIO Romero  Chief Complaint:    Chief Complaint   Patient presents with   • Memory Loss       History of Present Illness: Rolanda Lao is a 64 y.o. female who is here today in Neurology for  memory loss    History of Present Illness  The patient presents for evaluation of memory loss.  Memory Loss  She reports experiencing significant memory lapses, necessitating the use of written reminders to compensate for her forgetfulness. She has missed appointments due to her inability to remember them without written reminders. Despite these memory issues, she maintains her daily activities independently, including personal hygiene, dressing, and eating. Her weight remains stable. She occasionally experiences difficulty with speech, such as substituting words unintentionally, and has had instances of choking on food or water. She does not experience hallucinations or act out her dreams. She has been experiencing sleep disturbances, often waking up around 3:30 AM after going to bed around 11 PM. She does not report any unusual behaviors, such as storing clothes in inappropriate places. She has a peculiar eating habit, having consumed cream of wheat for supper consistently for two months. She has  fallen approximately four to five times this year, which she attributes to her back and knee conditions. She believes her knee and back conditions are affecting her mobility. She is currently undergoing physical therapy for her shoulders and is attempting to avoid shoulder replacement surgery. She reports no family history of memory loss among her siblings. She has abstained from alcohol for the past 10 to 15 years and does not use recreational drugs, except for marijuana.    Supplemental Information  She has bulging discs at L4 and L5 in her back. She has had two knee replacements and experiences significant pain in her right knee, which sometimes makes it difficult to lift her foot. She is right-handed.           MMSE 26/30  Meds:GBP, robaxin and flexeril  Appetite:good, weight steady  Sleep:wakes up early  ADLs:independent  Gait/Falls:4 this year due to bilat knee replacements with chronic pain and low back pain  Language:word finding difficulty  Dysphagia:Occasional choking on mints  Driving:none  Hallucinations:none  Behavior:none  Finances:pays her bills      She stopped marijuana use and continued to struggle w her memory.  Meds:robaxin, GBP,     PMH:migraine, glaucoma, DM, marcia, htn, hld, GERD, fibromyalgia, anxiety, depression, T2DM  FH:CAD, htn, hld, DM, PGM w dementia age 89  SH: +marijuana, , homemaker, -tob, -etoh  Subjective      Past Medical History:   Past Medical History:   Diagnosis Date   • Acid reflux     uncontrolled on dexilant 60mg QD, wakes up nightly with symptoms   • Allergic     allergy injections   • Anxiety    • Depression    • Depression    • Elevated cholesterol    • Fatigue    • Fibroid    • Hiatal hernia     with past repair   • Hypertension    • Iron deficiency anemia     not on supplments   • Migraines     topamax nightly and APAP prn   • Obesity (BMI 30-39.9)    • Ovarian cyst    • Sleep apnea     non compliant with CPAP   • Type 2 diabetes mellitus 2001    not on insulin,  last A1C 5.1       Past Surgical History:   Past Surgical History:   Procedure Laterality Date   • CARPAL TUNNEL RELEASE     • ENDOSCOPY  2020    Dr. Paul Beauchamp   • KNEE SURGERY      bilateral    • LAPAROSCOPY REPAIR HIATAL HERNIA  2016    Dr. Matilda Beauchamp? with mesh   • ROTATOR CUFF REPAIR     • SINUS SURGERY         Family History:   Family History   Problem Relation Age of Onset   • Prostate cancer Father    • Colon cancer Father    • Obesity Father    • Breast cancer Mother    • Obesity Mother    • Diabetes Mother    • Hypertension Mother    • Sleep apnea Mother    • Obesity Brother    • Hypertension Brother    • Stroke Brother    • Obesity Brother    • Hypertension Brother    • Heart attack Brother    • Sleep apnea Brother    • Obesity Sister    • Hypertension Sister    • Sleep apnea Sister    • Obesity Sister    • Hypertension Sister    • Obesity Paternal Grandfather    • Diabetes Paternal Grandfather    • Obesity Paternal Grandmother    • Heart attack Paternal Grandmother    • Obesity Maternal Grandmother    • Obesity Maternal Grandfather    • Hyperlipidemia Other    • Arthritis Other    • Mental illness Other    • Migraines Other        Social History:   Social History     Socioeconomic History   • Marital status:    Tobacco Use   • Smoking status: Former     Current packs/day: 0.00     Average packs/day: 2.0 packs/day for 10.0 years (20.0 ttl pk-yrs)     Types: Cigarettes     Start date: 1991     Quit date: 2001     Years since quittin.7   • Smokeless tobacco: Never   Vaping Use   • Vaping status: Never Used   Substance and Sexual Activity   • Alcohol use: No   • Drug use: Yes     Types: Marijuana     Comment: occasionally   • Sexual activity: Yes     Partners: Male       Medications:     Current Outpatient Medications:   •  acetaminophen (TYLENOL) 500 MG tablet, Take 2 tablets by mouth Every 6 (Six) Hours As Needed., Disp: , Rfl:   •  albuterol sulfate   (90 Base) MCG/ACT inhaler, Inhale 2 puffs Every 4 (Four) Hours As Needed for Wheezing or Shortness of Air., Disp: 18 g, Rfl: 2  •  Alcohol Swabs (DropSafe Alcohol Prep) 70 % pads, , Disp: , Rfl:   •  amLODIPine (NORVASC) 2.5 MG tablet, , Disp: , Rfl:   •  atorvastatin (LIPITOR) 40 MG tablet, TAKE 1 TABLET EVERY DAY, Disp: 90 tablet, Rfl: 10  •  busPIRone (BUSPAR) 5 MG tablet, Take 1 tablet by mouth 2 (Two) Times a Day., Disp: , Rfl:   •  cyclobenzaprine (FLEXERIL) 10 MG tablet, Take 1 tablet by mouth 3 (Three) Times a Day As Needed., Disp: , Rfl:   •  Diclofenac Sodium (VOLTAREN) 1 % gel gel, Place 1-2 g on the skin as directed by provider., Disp: , Rfl:   •  DULoxetine (CYMBALTA) 30 MG capsule, TAKE 1 CAPSULE EVERY DAY, Disp: 90 capsule, Rfl: 0  •  DULoxetine (CYMBALTA) 60 MG capsule, Take 1 capsule by mouth Daily., Disp: 90 capsule, Rfl: 1  •  fexofenadine (ALLEGRA) 180 MG tablet, Take 1 tablet by mouth Daily., Disp: , Rfl:   •  gabapentin (NEURONTIN) 300 MG capsule, Take 1 capsule by mouth 3 (Three) Times a Day., Disp: , Rfl:   •  glucose monitor monitoring kit, 1 each Daily. USE MACHINE ONCE A DAY AS DIRECTED, Disp: 1 each, Rfl: 0  •  hydroCHLOROthiazide (HYDRODIURIL) 12.5 MG tablet, TAKE 1 TABLET EVERY DAY, Disp: 90 tablet, Rfl: 0  •  hydrOXYzine (ATARAX) 50 MG tablet, Take 1 tablet by mouth., Disp: , Rfl:   •  levocetirizine (XYZAL) 5 MG tablet, Take 1 tablet by mouth., Disp: , Rfl:   •  methocarbamol (ROBAXIN) 500 MG tablet, Take 1 tablet by mouth., Disp: , Rfl:   •  mometasone (NASONEX) 50 MCG/ACT nasal spray, Daily., Disp: , Rfl:   •  ondansetron (ZOFRAN) 4 MG tablet, , Disp: , Rfl:   •  Semaglutide,0.25 or 0.5MG/DOS, (Ozempic, 0.25 or 0.5 MG/DOSE,) 2 MG/3ML solution pen-injector, INJECT 0.5 MG UNDER THE SKIN INTO THE APPROPRIATE AREA AS DIRECTED 1 TIME PER WEEK., Disp: 3 mL, Rfl: 10  •  simethicone (MYLICON,GAS-X) 125 MG capsule capsule, Take 1 capsule by mouth 4 (Four) Times a Day., Disp: , Rfl:   •  True  "Metrix Blood Glucose Test test strip, 1 each by Other route Daily., Disp: 100 each, Rfl: 4  •  TRUEplus Lancets 33G misc, , Disp: , Rfl:   •  vitamin C (ASCORBIC ACID) 500 MG tablet, , Disp: , Rfl:     Allergies:   Allergies   Allergen Reactions   • Latex Rash, Hives, Itching, Other (See Comments) and Unknown (See Comments)     blister  blisters  blisters  blisters  blister  blister   • Morphine And Codeine Itching, Hives and Unknown (See Comments)     \"skin crawls\" tolerates other pain meds  \"skin crawls\" tolerates other pain meds  \"skin crawls\" tolerates other pain meds       PHQ-9 Total Score:     [facility] Fall Risk Assessment has not been completed.    Objective     Physical Exam:   Physical Exam  Eyes:      General: Lids are normal.      Extraocular Movements: EOM normal. No nystagmus.   Neurological:      Coordination: Romberg sign negative.      Deep Tendon Reflexes:      Reflex Scores:       Bicep reflexes are 2+ on the right side and 2+ on the left side.       Brachioradialis reflexes are 2+ on the right side and 2+ on the left side.       Patellar reflexes are 2+ on the right side and 2+ on the left side.       Achilles reflexes are 2+ on the right side and 2+ on the left side.  Psychiatric:         Speech: Speech normal.         Neurological Exam  Mental Status  Awake, alert and oriented to person, place and time. Recent and remote memory are intact. Speech is normal. Follows three-step commands. Attention and concentration are normal. Fund of knowledge is appropriate for level of education.    Cranial Nerves  CN II: Right visual acuity: Finger movement. Left visual acuity: Finger movement. Right normal visual field. Left normal visual field.  CN III, IV, VI: Extraocular movements intact bilaterally. No nystagmus. Normal saccades. Normal lids and orbits bilaterally.   Right pupil: Round.   Left pupil: Round.  CN V: Facial sensation is normal.  CN VII: Full and symmetric facial movement.  CN IX, X: Palate " "elevates symmetrically  CN XI: Shoulder shrug strength is normal.  CN XII: Tongue midline without atrophy or fasciculations.    Motor  Normal muscle bulk throughout. No fasciculations present. Normal muscle tone. No abnormal involuntary movements. Strength is 5/5 in all four extremities except as noted. No pronator drift.    Sensory  Sensation is intact to light touch, pinprick, vibration and proprioception in all four extremities.    Reflexes                                            Right                      Left  Brachioradialis                    2+                         2+  Biceps                                 2+                         2+  Patellar                                2+                         2+  Achilles                                2+                         2+    Right pathological reflexes: Ankle clonus absent.  Left pathological reflexes: Ankle clonus absent.    Coordination  Right: Finger-to-nose normal. Rapid alternating movement normal. Heel-to-shin normal.    Gait  Normal casual, toe, heel and tandem gait. Romberg is absent. Able to rise from chair without using arms.     Physical Exam  Motor Examination    Strength: Upper and lower extremity strength is normal.    Involuntary Movements: No tremor observed.    Upper Extremity Drift Test: No upper extremity drift.    Reflexes    Deep Tendon Reflexes: Deep tendon reflexes are normal.    Sensory Examination    Light Touch, Vibration and Proprioception: Light touch is intact bilaterally.    Gait and Station    Balance: Negative Romberg sign.      Vital Signs:   Vitals:    01/13/25 0840   BP: 130/86   Pulse: 80   SpO2: 98%   Weight: 102 kg (224 lb 6.4 oz)   Height: 162.6 cm (64.02\")     Body mass index is 38.5 kg/m².         Assessment / Plan      Assessment/Plan:   Diagnoses and all orders for this visit:    1. Memory loss (Primary)  Comments:  MMSE is reassuring. MOCA next visit.       Assessment & Plan  1. Memory loss.  Her symptoms " "do not align with the diagnostic criteria for dementia. The Mini-Mental State Examination (MMSE) conducted today yielded normal results. However, due to her family history of dementia, she is at an increased risk. It was discussed that her current medication regimen, which includes muscle relaxants such as Flexeril and Robaxin, could potentially contribute to temporary memory loss. Additionally, gabapentin, a central nervous system suppressant, may induce a state of confusion or \"brain fog.\" She was advised to undergo annual memory testing, with the L[place] Cognitive Assessment (MoCA) test recommended for the following year. She was informed that while medication can not improve memory, it can slow the progression of memory loss. She was encouraged to contact the office if she experiences any changes in her condition or if her family expresses concern about her health.    Follow-up  The patient will follow up in 1 year.    PROCEDURE  The patient has had two knee replacements.        Patient Education:       Reviewed medications, potential side effects and signs and symptoms to report. Discussed risk versus benefits of treatment plan with patient and/or family-including medications, labs and radiology that may be ordered. Addressed questions and concerns during visit. Patient and/or family verbalized understanding and agree with plan. Instructed to call the office with any questions and report to ER with any life-threatening symptoms.     Follow Up:   Return in about 1 year (around 1/13/2026) for Recheck.    During this visit the following were done:  Labs Reviewed [x]    Labs Ordered []    Radiology Reports Reviewed [x]    Radiology Ordered []    PCP Records Reviewed [x]    Referring Provider Records Reviewed []    ER Records Reviewed []    Hospital Records Reviewed []    History Obtained From Family []    Radiology Images Reviewed []    Other Reviewed [x]    Records Requested []      Patient or patient " representative verbalized consent for the use of Ambient Listening during the visit with  Juana Bender DNP, HERMINIO for chart documentation. 1/13/2025  08:01 EST      Juana Bender DNP, APRN

## 2025-01-13 NOTE — LETTER
2025     HERMINIO Romero  [address]  [address]  [facility] [address]    Patient: Rolanda Lao   YOB: 1960   Date of Visit: 2025     Dear HERMINIO Romero:       Thank you for referring Rolanda Lao to me for evaluation. Below are the relevant portions of my assessment and plan of care.    If you have questions, please do not hesitate to call me. I look forward to following Rolanda along with you.         Sincerely,        Juana Bender DNP, APRN        CC: No Recipients    Juana Bender DNP, APRN  [phone]  Signed     Neuro Office Visit      Encounter Date: 2025   Patient Name: Rolanda Lao  : 1960   MRN: [id number]   PCP: HERMINIO Romero  Chief Complaint:    Chief Complaint   Patient presents with   • Memory Loss       History of Present Illness: Rolanda Lao is a 64 y.o. female who is here today in Neurology for  memory loss    History of Present Illness  The patient presents for evaluation of memory loss.  Memory Loss  She reports experiencing significant memory lapses, necessitating the use of written reminders to compensate for her forgetfulness. She has missed appointments due to her inability to remember them without written reminders. Despite these memory issues, she maintains her daily activities independently, including personal hygiene, dressing, and eating. Her weight remains stable. She occasionally experiences difficulty with speech, such as substituting words unintentionally, and has had instances of choking on food or water. She does not experience hallucinations or act out her dreams. She has been experiencing sleep disturbances, often waking up around 3:30 AM after going to bed around 11 PM. She does not report any unusual behaviors, such as storing clothes in inappropriate places. She has a peculiar eating habit, having consumed cream of wheat for supper consistently for two months.  She has fallen approximately four to five times this year, which she attributes to her back and knee conditions. She believes her knee and back conditions are affecting her mobility. She is currently undergoing physical therapy for her shoulders and is attempting to avoid shoulder replacement surgery. She reports no family history of memory loss among her siblings. She has abstained from alcohol for the past 10 to 15 years and does not use recreational drugs, except for marijuana.    Supplemental Information  She has bulging discs at L4 and L5 in her back. She has had two knee replacements and experiences significant pain in her right knee, which sometimes makes it difficult to lift her foot. She is right-handed.           MMSE 26/30  Meds:GBP, robaxin and flexeril  Appetite:good, weight steady  Sleep:wakes up early  ADLs:independent  Gait/Falls:4 this year due to bilat knee replacements with chronic pain and low back pain  Language:word finding difficulty  Dysphagia:Occasional choking on mints  Driving:none  Hallucinations:none  Behavior:none  Finances:pays her bills      She stopped marijuana use and continued to struggle w her memory.  Meds:robaxin, GBP,     PMH:migraine, glaucoma, DM, marcia, htn, hld, GERD, fibromyalgia, anxiety, depression, T2DM  FH:CAD, htn, hld, DM, PGM w dementia age 89  SH: +marijuana, , homemaker, -tob, -etoh  Subjective      Past Medical History:   Past Medical History:   Diagnosis Date   • Acid reflux     uncontrolled on dexilant 60mg QD, wakes up nightly with symptoms   • Allergic     allergy injections   • Anxiety    • Depression    • Depression    • Elevated cholesterol    • Fatigue    • Fibroid    • Hiatal hernia     with past repair   • Hypertension    • Iron deficiency anemia     not on supplments   • Migraines     topamax nightly and APAP prn   • Obesity (BMI 30-39.9)    • Ovarian cyst    • Sleep apnea     non compliant with CPAP   • Type 2 diabetes mellitus 2001    not on  insulin, last A1C 5.1       Past Surgical History:   Past Surgical History:   Procedure Laterality Date   • CARPAL TUNNEL RELEASE     • ENDOSCOPY  2020    Dr. Paul Beauchamp   • KNEE SURGERY      bilateral    • LAPAROSCOPY REPAIR HIATAL HERNIA  2016    Dr. Matilda Beauchamp? with mesh   • ROTATOR CUFF REPAIR     • SINUS SURGERY         Family History:   Family History   Problem Relation Age of Onset   • Prostate cancer Father    • Colon cancer Father    • Obesity Father    • Breast cancer Mother    • Obesity Mother    • Diabetes Mother    • Hypertension Mother    • Sleep apnea Mother    • Obesity Brother    • Hypertension Brother    • Stroke Brother    • Obesity Brother    • Hypertension Brother    • Heart attack Brother    • Sleep apnea Brother    • Obesity Sister    • Hypertension Sister    • Sleep apnea Sister    • Obesity Sister    • Hypertension Sister    • Obesity Paternal Grandfather    • Diabetes Paternal Grandfather    • Obesity Paternal Grandmother    • Heart attack Paternal Grandmother    • Obesity Maternal Grandmother    • Obesity Maternal Grandfather    • Hyperlipidemia Other    • Arthritis Other    • Mental illness Other    • Migraines Other        Social History:   Social History     Socioeconomic History   • Marital status:    Tobacco Use   • Smoking status: Former     Current packs/day: 0.00     Average packs/day: 2.0 packs/day for 10.0 years (20.0 ttl pk-yrs)     Types: Cigarettes     Start date: 1991     Quit date: 2001     Years since quittin.7   • Smokeless tobacco: Never   Vaping Use   • Vaping status: Never Used   Substance and Sexual Activity   • Alcohol use: No   • Drug use: Yes     Types: Marijuana     Comment: occasionally   • Sexual activity: Yes     Partners: Male       Medications:     Current Outpatient Medications:   •  acetaminophen (TYLENOL) 500 MG tablet, Take 2 tablets by mouth Every 6 (Six) Hours As Needed., Disp: , Rfl:   •  albuterol sulfate   (90 Base) MCG/ACT inhaler, Inhale 2 puffs Every 4 (Four) Hours As Needed for Wheezing or Shortness of Air., Disp: 18 g, Rfl: 2  •  Alcohol Swabs (DropSafe Alcohol Prep) 70 % pads, , Disp: , Rfl:   •  amLODIPine (NORVASC) 2.5 MG tablet, , Disp: , Rfl:   •  atorvastatin (LIPITOR) 40 MG tablet, TAKE 1 TABLET EVERY DAY, Disp: 90 tablet, Rfl: 10  •  busPIRone (BUSPAR) 5 MG tablet, Take 1 tablet by mouth 2 (Two) Times a Day., Disp: , Rfl:   •  cyclobenzaprine (FLEXERIL) 10 MG tablet, Take 1 tablet by mouth 3 (Three) Times a Day As Needed., Disp: , Rfl:   •  Diclofenac Sodium (VOLTAREN) 1 % gel gel, Place 1-2 g on the skin as directed by provider., Disp: , Rfl:   •  DULoxetine (CYMBALTA) 30 MG capsule, TAKE 1 CAPSULE EVERY DAY, Disp: 90 capsule, Rfl: 0  •  DULoxetine (CYMBALTA) 60 MG capsule, Take 1 capsule by mouth Daily., Disp: 90 capsule, Rfl: 1  •  fexofenadine (ALLEGRA) 180 MG tablet, Take 1 tablet by mouth Daily., Disp: , Rfl:   •  gabapentin (NEURONTIN) 300 MG capsule, Take 1 capsule by mouth 3 (Three) Times a Day., Disp: , Rfl:   •  glucose monitor monitoring kit, 1 each Daily. USE MACHINE ONCE A DAY AS DIRECTED, Disp: 1 each, Rfl: 0  •  hydroCHLOROthiazide (HYDRODIURIL) 12.5 MG tablet, TAKE 1 TABLET EVERY DAY, Disp: 90 tablet, Rfl: 0  •  hydrOXYzine (ATARAX) 50 MG tablet, Take 1 tablet by mouth., Disp: , Rfl:   •  levocetirizine (XYZAL) 5 MG tablet, Take 1 tablet by mouth., Disp: , Rfl:   •  methocarbamol (ROBAXIN) 500 MG tablet, Take 1 tablet by mouth., Disp: , Rfl:   •  mometasone (NASONEX) 50 MCG/ACT nasal spray, Daily., Disp: , Rfl:   •  ondansetron (ZOFRAN) 4 MG tablet, , Disp: , Rfl:   •  Semaglutide,0.25 or 0.5MG/DOS, (Ozempic, 0.25 or 0.5 MG/DOSE,) 2 MG/3ML solution pen-injector, INJECT 0.5 MG UNDER THE SKIN INTO THE APPROPRIATE AREA AS DIRECTED 1 TIME PER WEEK., Disp: 3 mL, Rfl: 10  •  simethicone (MYLICON,GAS-X) 125 MG capsule capsule, Take 1 capsule by mouth 4 (Four) Times a Day., Disp: , Rfl:  "  •  True Metrix Blood Glucose Test test strip, 1 each by Other route Daily., Disp: 100 each, Rfl: 4  •  TRUEplus Lancets 33G misc, , Disp: , Rfl:   •  vitamin C (ASCORBIC ACID) 500 MG tablet, , Disp: , Rfl:     Allergies:   Allergies   Allergen Reactions   • Latex Rash, Hives, Itching, Other (See Comments) and Unknown (See Comments)     blister  blisters  blisters  blisters  blister  blister   • Morphine And Codeine Itching, Hives and Unknown (See Comments)     \"skin crawls\" tolerates other pain meds  \"skin crawls\" tolerates other pain meds  \"skin crawls\" tolerates other pain meds       PHQ-9 Total Score:     [facility].    Objective     Physical Exam:   Physical Exam  Eyes:      General: Lids are normal.      Extraocular Movements: EOM normal. No nystagmus.   Neurological:      Coordination: Romberg sign negative.      Deep Tendon Reflexes:      Reflex Scores:       Bicep reflexes are 2+ on the right side and 2+ on the left side.       Brachioradialis reflexes are 2+ on the right side and 2+ on the left side.       Patellar reflexes are 2+ on the right side and 2+ on the left side.       Achilles reflexes are 2+ on the right side and 2+ on the left side.  Psychiatric:         Speech: Speech normal.         Neurological Exam  Mental Status  Awake, alert and oriented to person, place and time. Recent and remote memory are intact. Speech is normal. Follows three-step commands. Attention and concentration are normal. Fund of knowledge is appropriate for level of education.    Cranial Nerves  CN II: Right visual acuity: Finger movement. Left visual acuity: Finger movement. Right normal visual field. Left normal visual field.  CN III, IV, VI: Extraocular movements intact bilaterally. No nystagmus. Normal saccades. Normal lids and orbits bilaterally.   Right pupil: Round.   Left pupil: Round.  CN V: Facial sensation is normal.  CN VII: Full and symmetric facial movement.  CN IX, X: " "Palate elevates symmetrically  CN XI: Shoulder shrug strength is normal.  CN XII: Tongue midline without atrophy or fasciculations.    Motor  Normal muscle bulk throughout. No fasciculations present. Normal muscle tone. No abnormal involuntary movements. Strength is 5/5 in all four extremities except as noted. No pronator drift.    Sensory  Sensation is intact to light touch, pinprick, vibration and proprioception in all four extremities.    Reflexes                                            Right                      Left  Brachioradialis                    2+                         2+  Biceps                                 2+                         2+  Patellar                                2+                         2+  Achilles                                2+                         2+    Right pathological reflexes: Ankle clonus absent.  Left pathological reflexes: Ankle clonus absent.    Coordination  Right: Finger-to-nose normal. Rapid alternating movement normal. Heel-to-shin normal.    Gait  Normal casual, toe, heel and tandem gait. Romberg is absent. Able to rise from chair without using arms.     Physical Exam  Motor Examination    Strength: Upper and lower extremity strength is normal.    Involuntary Movements: No tremor observed.    Upper Extremity Drift Test: No upper extremity drift.    Reflexes    Deep Tendon Reflexes: Deep tendon reflexes are normal.    Sensory Examination    Light Touch, Vibration and Proprioception: Light touch is intact bilaterally.    Gait and Station    Balance: Negative Romberg sign.      Vital Signs:   Vitals:    01/13/25 0840   BP: 130/86   Pulse: 80   SpO2: 98%   Weight: 102 kg (224 lb 6.4 oz)   Height: 162.6 cm (64.02\")     Body mass index is 38.5 kg/m².         Assessment / Plan      Assessment/Plan:   Diagnoses and all orders for this visit:    1. Memory loss (Primary)  Comments:  MMSE is reassuring. MOCA next visit.       Assessment & Plan  1. Memory loss.  Her " "symptoms do not align with the diagnostic criteria for dementia. The Mini-Mental State Examination (MMSE) conducted today yielded normal results. However, due to her family history of dementia, she is at an increased risk. It was discussed that her current medication regimen, which includes muscle relaxants such as Flexeril and Robaxin, could potentially contribute to temporary memory loss. Additionally, gabapentin, a central nervous system suppressant, may induce a state of confusion or \"brain fog.\" She was advised to undergo annual memory testing, with the Cumby Cognitive Assessment (MoCA) test recommended for the following year. She was informed that while medication can not improve memory, it can slow the progression of memory loss. She was encouraged to contact the office if she experiences any changes in her condition or if her family expresses concern about her health.    Follow-up  The patient will follow up in 1 year.    PROCEDURE  The patient has had two knee replacements.        Patient Education:       Reviewed medications, potential side effects and signs and symptoms to report. Discussed risk versus benefits of treatment plan with patient and/or family-including medications, labs and radiology that may be ordered. Addressed questions and concerns during visit. Patient and/or family verbalized understanding and agree with plan. Instructed to call the office with any questions and report to ER with any life-threatening symptoms.     Follow Up:   Return in about 1 year (around 1/13/2026) for Recheck.    During this visit the following were done:  Labs Reviewed [x]    Labs Ordered []    Radiology Reports Reviewed [x]    Radiology Ordered []    PCP Records Reviewed [x]    Referring Provider Records Reviewed []    ER Records Reviewed []    Hospital Records Reviewed []    History Obtained From Family []    Radiology Images Reviewed []    Other Reviewed [x]    Records Requested []      Patient or patient " representative verbalized consent for the use of Ambient Listening during the visit with  Juana Bender DNP, HERMINIO for chart documentation. 1/13/2025  08:01 EST      Juana Bender DNP, APRN

## 2025-01-13 NOTE — PROGRESS NOTES
Neuro Office Visit      Encounter Date: 2025   Patient Name: Rolanda Lao  : 1960   MRN: [id number]   PCP: HERMINIO Romero  Chief Complaint:    Chief Complaint   Patient presents with    Memory Loss       History of Present Illness: Rolanda Lao is a 64 y.o. female who is here today in Neurology for  memory loss    History of Present Illness  The patient presents for evaluation of memory loss.  Memory Loss  She reports experiencing significant memory lapses, necessitating the use of written reminders to compensate for her forgetfulness. She has missed appointments due to her inability to remember them without written reminders. Despite these memory issues, she maintains her daily activities independently, including personal hygiene, dressing, and eating. Her weight remains stable. She occasionally experiences difficulty with speech, such as substituting words unintentionally, and has had instances of choking on food or water. She does not experience hallucinations or act out her dreams. She has been experiencing sleep disturbances, often waking up around 3:30 AM after going to bed around 11 PM. She does not report any unusual behaviors, such as storing clothes in inappropriate places. She has a peculiar eating habit, having consumed cream of wheat for supper consistently for two months. She has fallen approximately four to five times this year, which she attributes to her back and knee conditions. She believes her knee and back conditions are affecting her mobility. She is currently undergoing physical therapy for her shoulders and is attempting to avoid shoulder replacement surgery. She reports no family history of memory loss among her siblings. She has abstained from alcohol for the past 10 to 15 years and does not use recreational drugs, except for marijuana.    Supplemental Information  She has bulging discs at L4 and L5 in her back. She has had two knee replacements  and experiences significant pain in her right knee, which sometimes makes it difficult to lift her foot. She is right-handed.           MMSE 26/30  Meds:GBP, robaxin and flexeril  Appetite:good, weight steady  Sleep:wakes up early  ADLs:independent  Gait/Falls:4 this year due to bilat knee replacements with chronic pain and low back pain  Language:word finding difficulty  Dysphagia:Occasional choking on mints  Driving:none  Hallucinations:none  Behavior:none  Finances:pays her bills      She stopped marijuana use and continued to struggle w her memory.  Meds:robaxin, GBP,     PMH:migraine, glaucoma, DM, marcia, htn, hld, GERD, fibromyalgia, anxiety, depression, T2DM  FH:CAD, htn, hld, DM, PGM w dementia age 89  SH: +marijuana, , homemaker, -tob, -etoh  Subjective      Past Medical History:   Past Medical History:   Diagnosis Date    Acid reflux     uncontrolled on dexilant 60mg QD, wakes up nightly with symptoms    Allergic     allergy injections    Anxiety     Depression     Depression     Elevated cholesterol     Fatigue     Fibroid     Hiatal hernia     with past repair    Hypertension     Iron deficiency anemia     not on supplments    Migraines     topamax nightly and APAP prn    Obesity (BMI 30-39.9)     Ovarian cyst     Sleep apnea     non compliant with CPAP    Type 2 diabetes mellitus 2001    not on insulin, last A1C 5.1       Past Surgical History:   Past Surgical History:   Procedure Laterality Date    CARPAL TUNNEL RELEASE      ENDOSCOPY  03/13/2020    Dr. Paul Beauchamp    KNEE SURGERY      bilateral 1997/1998    LAPAROSCOPY REPAIR HIATAL HERNIA  2016    Dr. Matilda Beauchamp? with mesh    ROTATOR CUFF REPAIR      SINUS SURGERY         Family History:   Family History   Problem Relation Age of Onset    Prostate cancer Father     Colon cancer Father     Obesity Father     Breast cancer Mother     Obesity Mother     Diabetes Mother     Hypertension Mother     Sleep apnea Mother     Obesity Brother      Hypertension Brother     Stroke Brother     Obesity Brother     Hypertension Brother     Heart attack Brother     Sleep apnea Brother     Obesity Sister     Hypertension Sister     Sleep apnea Sister     Obesity Sister     Hypertension Sister     Obesity Paternal Grandfather     Diabetes Paternal Grandfather     Obesity Paternal Grandmother     Heart attack Paternal Grandmother     Obesity Maternal Grandmother     Obesity Maternal Grandfather     Hyperlipidemia Other     Arthritis Other     Mental illness Other     Migraines Other        Social History:   Social History     Socioeconomic History    Marital status:    Tobacco Use    Smoking status: Former     Current packs/day: 0.00     Average packs/day: 2.0 packs/day for 10.0 years (20.0 ttl pk-yrs)     Types: Cigarettes     Start date: 1991     Quit date: 2001     Years since quittin.7    Smokeless tobacco: Never   Vaping Use    Vaping status: Never Used   Substance and Sexual Activity    Alcohol use: No    Drug use: Yes     Types: Marijuana     Comment: occasionally    Sexual activity: Yes     Partners: Male       Medications:     Current Outpatient Medications:     acetaminophen (TYLENOL) 500 MG tablet, Take 2 tablets by mouth Every 6 (Six) Hours As Needed., Disp: , Rfl:     albuterol sulfate  (90 Base) MCG/ACT inhaler, Inhale 2 puffs Every 4 (Four) Hours As Needed for Wheezing or Shortness of Air., Disp: 18 g, Rfl: 2    Alcohol Swabs (DropSafe Alcohol Prep) 70 % pads, , Disp: , Rfl:     amLODIPine (NORVASC) 2.5 MG tablet, , Disp: , Rfl:     atorvastatin (LIPITOR) 40 MG tablet, TAKE 1 TABLET EVERY DAY, Disp: 90 tablet, Rfl: 10    busPIRone (BUSPAR) 5 MG tablet, Take 1 tablet by mouth 2 (Two) Times a Day., Disp: , Rfl:     cyclobenzaprine (FLEXERIL) 10 MG tablet, Take 1 tablet by mouth 3 (Three) Times a Day As Needed., Disp: , Rfl:     Diclofenac Sodium (VOLTAREN) 1 % gel gel, Place 1-2 g on the skin as directed by provider., Disp: ,  "Rfl:     DULoxetine (CYMBALTA) 30 MG capsule, TAKE 1 CAPSULE EVERY DAY, Disp: 90 capsule, Rfl: 0    DULoxetine (CYMBALTA) 60 MG capsule, Take 1 capsule by mouth Daily., Disp: 90 capsule, Rfl: 1    fexofenadine (ALLEGRA) 180 MG tablet, Take 1 tablet by mouth Daily., Disp: , Rfl:     gabapentin (NEURONTIN) 300 MG capsule, Take 1 capsule by mouth 3 (Three) Times a Day., Disp: , Rfl:     glucose monitor monitoring kit, 1 each Daily. USE MACHINE ONCE A DAY AS DIRECTED, Disp: 1 each, Rfl: 0    hydroCHLOROthiazide (HYDRODIURIL) 12.5 MG tablet, TAKE 1 TABLET EVERY DAY, Disp: 90 tablet, Rfl: 0    hydrOXYzine (ATARAX) 50 MG tablet, Take 1 tablet by mouth., Disp: , Rfl:     levocetirizine (XYZAL) 5 MG tablet, Take 1 tablet by mouth., Disp: , Rfl:     methocarbamol (ROBAXIN) 500 MG tablet, Take 1 tablet by mouth., Disp: , Rfl:     mometasone (NASONEX) 50 MCG/ACT nasal spray, Daily., Disp: , Rfl:     ondansetron (ZOFRAN) 4 MG tablet, , Disp: , Rfl:     Semaglutide,0.25 or 0.5MG/DOS, (Ozempic, 0.25 or 0.5 MG/DOSE,) 2 MG/3ML solution pen-injector, INJECT 0.5 MG UNDER THE SKIN INTO THE APPROPRIATE AREA AS DIRECTED 1 TIME PER WEEK., Disp: 3 mL, Rfl: 10    simethicone (MYLICON,GAS-X) 125 MG capsule capsule, Take 1 capsule by mouth 4 (Four) Times a Day., Disp: , Rfl:     True Metrix Blood Glucose Test test strip, 1 each by Other route Daily., Disp: 100 each, Rfl: 4    TRUEplus Lancets 33G misc, , Disp: , Rfl:     vitamin C (ASCORBIC ACID) 500 MG tablet, , Disp: , Rfl:     Allergies:   Allergies   Allergen Reactions    Latex Rash, Hives, Itching, Other (See Comments) and Unknown (See Comments)     blister  blisters  blisters  blisters  blister  blister    Morphine And Codeine Itching, Hives and Unknown (See Comments)     \"skin crawls\" tolerates other pain meds  \"skin crawls\" tolerates other pain meds  \"skin crawls\" tolerates other pain meds       PHQ-9 Total Score:     LIS Fall Risk Assessment has not been completed.    Objective "     Physical Exam:   Physical Exam  Eyes:      General: Lids are normal.      Extraocular Movements: EOM normal. No nystagmus.   Neurological:      Coordination: Romberg sign negative.      Deep Tendon Reflexes:      Reflex Scores:       Bicep reflexes are 2+ on the right side and 2+ on the left side.       Brachioradialis reflexes are 2+ on the right side and 2+ on the left side.       Patellar reflexes are 2+ on the right side and 2+ on the left side.       Achilles reflexes are 2+ on the right side and 2+ on the left side.  Psychiatric:         Speech: Speech normal.         Neurological Exam  Mental Status  Awake, alert and oriented to person, place and time. Recent and remote memory are intact. Speech is normal. Follows three-step commands. Attention and concentration are normal. Fund of knowledge is appropriate for level of education.    Cranial Nerves  CN II: Right visual acuity: Finger movement. Left visual acuity: Finger movement. Right normal visual field. Left normal visual field.  CN III, IV, VI: Extraocular movements intact bilaterally. No nystagmus. Normal saccades. Normal lids and orbits bilaterally.   Right pupil: Round.   Left pupil: Round.  CN V: Facial sensation is normal.  CN VII: Full and symmetric facial movement.  CN IX, X: Palate elevates symmetrically  CN XI: Shoulder shrug strength is normal.  CN XII: Tongue midline without atrophy or fasciculations.    Motor  Normal muscle bulk throughout. No fasciculations present. Normal muscle tone. No abnormal involuntary movements. Strength is 5/5 in all four extremities except as noted. No pronator drift.    Sensory  Sensation is intact to light touch, pinprick, vibration and proprioception in all four extremities.    Reflexes                                            Right                      Left  Brachioradialis                    2+                         2+  Biceps                                 2+                         2+  Patellar         "                        2+                         2+  Achilles                                2+                         2+    Right pathological reflexes: Ankle clonus absent.  Left pathological reflexes: Ankle clonus absent.    Coordination  Right: Finger-to-nose normal. Rapid alternating movement normal. Heel-to-shin normal.    Gait  Normal casual, toe, heel and tandem gait. Romberg is absent. Able to rise from chair without using arms.     Physical Exam  Motor Examination    Strength: Upper and lower extremity strength is normal.    Involuntary Movements: No tremor observed.    Upper Extremity Drift Test: No upper extremity drift.    Reflexes    Deep Tendon Reflexes: Deep tendon reflexes are normal.    Sensory Examination    Light Touch, Vibration and Proprioception: Light touch is intact bilaterally.    Gait and Station    Balance: Negative Romberg sign.      Vital Signs:   Vitals:    01/13/25 0840   BP: 130/86   Pulse: 80   SpO2: 98%   Weight: 102 kg (224 lb 6.4 oz)   Height: 162.6 cm (64.02\")     Body mass index is 38.5 kg/m².         Assessment / Plan      Assessment/Plan:   Diagnoses and all orders for this visit:    1. Memory loss (Primary)  Comments:  MMSE is reassuring. MOCA next visit.       Assessment & Plan  1. Memory loss.  Her symptoms do not align with the diagnostic criteria for dementia. The Mini-Mental State Examination (MMSE) conducted today yielded normal results. However, due to her family history of dementia, she is at an increased risk. It was discussed that her current medication regimen, which includes muscle relaxants such as Flexeril and Robaxin, could potentially contribute to temporary memory loss. Additionally, gabapentin, a central nervous system suppressant, may induce a state of confusion or \"brain fog.\" She was advised to undergo annual memory testing, with the [place] Cognitive Assessment (MoCA) test recommended for the following year. She was informed that while medication " can not improve memory, it can slow the progression of memory loss. She was encouraged to contact the office if she experiences any changes in her condition or if her family expresses concern about her health.    Follow-up  The patient will follow up in 1 year.    PROCEDURE  The patient has had two knee replacements.        Patient Education:       Reviewed medications, potential side effects and signs and symptoms to report. Discussed risk versus benefits of treatment plan with patient and/or family-including medications, labs and radiology that may be ordered. Addressed questions and concerns during visit. Patient and/or family verbalized understanding and agree with plan. Instructed to call the office with any questions and report to ER with any life-threatening symptoms.     Follow Up:   Return in about 1 year (around 1/13/2026) for Recheck.    During this visit the following were done:  Labs Reviewed [x]    Labs Ordered []    Radiology Reports Reviewed [x]    Radiology Ordered []    PCP Records Reviewed [x]    Referring Provider Records Reviewed []    ER Records Reviewed []    Hospital Records Reviewed []    History Obtained From Family []    Radiology Images Reviewed []    Other Reviewed [x]    Records Requested []      Patient or patient representative verbalized consent for the use of Ambient Listening during the visit with  Juana Bender DNP, APRDEANNA for chart documentation. 1/13/2025  08:01 EST      Juana Bender DNP, APRN

## 2025-02-27 ENCOUNTER — OFFICE VISIT (OUTPATIENT)
Dept: ORTHOPEDIC SURGERY | Facility: CLINIC | Age: 65
End: 2025-02-27
Payer: MEDICARE

## 2025-02-27 VITALS
BODY MASS INDEX: 38.62 KG/M2 | SYSTOLIC BLOOD PRESSURE: 116 MMHG | HEIGHT: 64 IN | DIASTOLIC BLOOD PRESSURE: 68 MMHG | WEIGHT: 226.2 LBS

## 2025-02-27 DIAGNOSIS — Z96.659 PAIN IN KNEE REGION AFTER TOTAL KNEE REPLACEMENT, INITIAL ENCOUNTER: ICD-10-CM

## 2025-02-27 DIAGNOSIS — T84.84XA PAIN IN KNEE REGION AFTER TOTAL KNEE REPLACEMENT, INITIAL ENCOUNTER: ICD-10-CM

## 2025-02-27 DIAGNOSIS — M25.561 RIGHT KNEE PAIN, UNSPECIFIED CHRONICITY: Primary | ICD-10-CM

## 2025-02-27 RX ORDER — AMOXICILLIN 500 MG/1
CAPSULE ORAL
COMMUNITY
Start: 2025-02-20

## 2025-02-27 RX ORDER — MELOXICAM 15 MG/1
TABLET ORAL
COMMUNITY
Start: 2025-01-30

## 2025-02-27 RX ORDER — SEMAGLUTIDE 2.68 MG/ML
INJECTION, SOLUTION SUBCUTANEOUS
COMMUNITY
Start: 2025-02-12

## 2025-02-27 RX ORDER — AZITHROMYCIN 250 MG/1
TABLET, FILM COATED ORAL
COMMUNITY
Start: 2025-02-20

## 2025-02-27 RX ORDER — GABAPENTIN 400 MG/1
CAPSULE ORAL
COMMUNITY
Start: 2025-02-24

## 2025-02-27 RX ORDER — DESIPRAMINE HYDROCHLORIDE 10 MG/1
TABLET ORAL
COMMUNITY
Start: 2025-02-25

## 2025-02-27 RX ORDER — TRAMADOL HYDROCHLORIDE 50 MG/1
TABLET ORAL AS NEEDED
COMMUNITY
Start: 2025-02-20

## 2025-02-27 RX ORDER — BUSPIRONE HYDROCHLORIDE 10 MG/1
1 TABLET ORAL EVERY 12 HOURS SCHEDULED
COMMUNITY
Start: 2025-01-16
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
## 2025-03-13 ENCOUNTER — OFFICE VISIT (OUTPATIENT)
Dept: ORTHOPEDIC SURGERY | Facility: CLINIC | Age: 65
End: 2025-03-13
Payer: MEDICARE

## 2025-03-13 VITALS
HEIGHT: 64 IN | SYSTOLIC BLOOD PRESSURE: 118 MMHG | WEIGHT: 226.2 LBS | BODY MASS INDEX: 38.62 KG/M2 | DIASTOLIC BLOOD PRESSURE: 74 MMHG

## 2025-03-13 DIAGNOSIS — M75.101 RIGHT ROTATOR CUFF TEAR ARTHROPATHY: ICD-10-CM
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
## 2025-03-13 DIAGNOSIS — M12.811 RIGHT ROTATOR CUFF TEAR ARTHROPATHY: ICD-10-CM

## 2025-03-13 DIAGNOSIS — M25.511 CHRONIC RIGHT SHOULDER PAIN: Primary | ICD-10-CM

## 2025-03-13 DIAGNOSIS — G89.29 CHRONIC RIGHT SHOULDER PAIN: Primary | ICD-10-CM

## 2025-03-13 PROCEDURE — 3074F SYST BP LT 130 MM HG: CPT | Performed by: ORTHOPAEDIC SURGERY

## 2025-03-13 PROCEDURE — 3078F DIAST BP <80 MM HG: CPT | Performed by: ORTHOPAEDIC SURGERY

## 2025-03-13 PROCEDURE — 99213 OFFICE O/P EST LOW 20 MIN: CPT | Performed by: ORTHOPAEDIC SURGERY
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
## 2025-07-15 ENCOUNTER — OFFICE VISIT (OUTPATIENT)
Age: 65
End: 2025-07-15
Payer: MEDICARE

## 2025-07-15 VITALS
WEIGHT: 224 LBS | HEIGHT: 64 IN | SYSTOLIC BLOOD PRESSURE: 130 MMHG | BODY MASS INDEX: 38.24 KG/M2 | DIASTOLIC BLOOD PRESSURE: 84 MMHG

## 2025-07-15 DIAGNOSIS — M25.511 RIGHT SHOULDER PAIN, UNSPECIFIED CHRONICITY: ICD-10-CM

## 2025-07-15 DIAGNOSIS — Z98.890 STATUS POST RIGHT ROTATOR CUFF REPAIR: ICD-10-CM

## 2025-07-15 DIAGNOSIS — M75.121 NONTRAUMATIC COMPLETE TEAR OF RIGHT ROTATOR CUFF: Primary | ICD-10-CM

## 2025-07-15 RX ORDER — HYDROXYZINE PAMOATE 50 MG/1
50 CAPSULE ORAL
COMMUNITY

## 2025-07-15 RX ORDER — AMLODIPINE BESYLATE 5 MG/1
1 TABLET ORAL DAILY
COMMUNITY
Start: 2025-06-17

## 2025-07-15 RX ORDER — DEXAMETHASONE SODIUM PHOSPHATE 4 MG/ML
4 INJECTION, SOLUTION INTRA-ARTICULAR; INTRALESIONAL; INTRAMUSCULAR; INTRAVENOUS; SOFT TISSUE
Status: COMPLETED | OUTPATIENT
Start: 2025-07-15 | End: 2025-07-15

## 2025-07-15 RX ORDER — LIDOCAINE HYDROCHLORIDE 10 MG/ML
5 INJECTION, SOLUTION EPIDURAL; INFILTRATION; INTRACAUDAL; PERINEURAL
Status: COMPLETED | OUTPATIENT
Start: 2025-07-15 | End: 2025-07-15

## 2025-07-15 RX ORDER — DICLOFENAC SODIUM 75 MG/1
1 TABLET, DELAYED RELEASE ORAL 2 TIMES DAILY
COMMUNITY

## 2025-07-15 RX ORDER — PSEUDOEPHEDRINE HCL 30 MG
TABLET ORAL
COMMUNITY
Start: 2025-06-17

## 2025-07-15 RX ADMIN — DEXAMETHASONE SODIUM PHOSPHATE 4 MG: 4 INJECTION, SOLUTION INTRA-ARTICULAR; INTRALESIONAL; INTRAMUSCULAR; INTRAVENOUS; SOFT TISSUE at 10:46

## 2025-07-15 RX ADMIN — LIDOCAINE HYDROCHLORIDE 5 ML: 10 INJECTION, SOLUTION EPIDURAL; INFILTRATION; INTRACAUDAL; PERINEURAL at 10:46

## 2025-07-15 NOTE — PROGRESS NOTES
Procedure   - Large Joint Arthrocentesis: R subacromial bursa on 7/15/2025 10:46 AM  Indications: pain  Details: 21 G needle, posterior approach  Medications: 5 mL lidocaine PF 1% 1 %; 4 mg dexAMETHasone 4 MG/ML  Outcome: tolerated well, no immediate complications  Procedure, treatment alternatives, risks and benefits explained, specific risks discussed. Consent was given by the patient. Immediately prior to procedure a time out was called to verify the correct patient, procedure, equipment, support staff and site/side marked as required. Patient was prepped and draped in the usual sterile fashion.
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
